# Patient Record
Sex: MALE | Race: WHITE | NOT HISPANIC OR LATINO | Employment: FULL TIME | URBAN - METROPOLITAN AREA
[De-identification: names, ages, dates, MRNs, and addresses within clinical notes are randomized per-mention and may not be internally consistent; named-entity substitution may affect disease eponyms.]

---

## 2022-04-20 ENCOUNTER — OFFICE VISIT (OUTPATIENT)
Dept: FAMILY MEDICINE | Facility: CLINIC | Age: 42
End: 2022-04-20
Payer: COMMERCIAL

## 2022-04-20 ENCOUNTER — ANCILLARY PROCEDURE (OUTPATIENT)
Dept: GENERAL RADIOLOGY | Facility: CLINIC | Age: 42
End: 2022-04-20
Attending: PHYSICIAN ASSISTANT
Payer: COMMERCIAL

## 2022-04-20 VITALS
TEMPERATURE: 98.1 F | BODY MASS INDEX: 29.99 KG/M2 | SYSTOLIC BLOOD PRESSURE: 134 MMHG | RESPIRATION RATE: 15 BRPM | DIASTOLIC BLOOD PRESSURE: 85 MMHG | WEIGHT: 215 LBS | HEART RATE: 79 BPM | OXYGEN SATURATION: 98 %

## 2022-04-20 DIAGNOSIS — S69.92XA INJURY OF LEFT THUMB, INITIAL ENCOUNTER: Primary | ICD-10-CM

## 2022-04-20 PROCEDURE — 73140 X-RAY EXAM OF FINGER(S): CPT | Mod: TC | Performed by: RADIOLOGY

## 2022-04-20 PROCEDURE — 99203 OFFICE O/P NEW LOW 30 MIN: CPT | Performed by: PHYSICIAN ASSISTANT

## 2022-04-20 RX ORDER — DIPHENHYDRAMINE HCL 25 MG
25 CAPSULE ORAL EVERY 6 HOURS PRN
COMMUNITY
End: 2022-11-02

## 2022-04-20 NOTE — PROGRESS NOTES
"  Assessment & Plan:      Problem List Items Addressed This Visit    None     Visit Diagnoses     Injury of left thumb, initial encounter    -  Primary    Relevant Orders    XR Finger Left G/E 2 Views (Completed)        Medical Decision Making  Patient presents with ongoing left thumb pain for 6 weeks.  X-rays negative for acute fracture and patient maintains full range of motion of all finger joints with pain during flexion of the IP joint.  Patient most likely sustained a sprain versus mild tendinitis.  Recommend cold compresses, ibuprofen, and avoidance of aggravating activities.  Discussed expected course of healing.  Discussed signs of worsening symptoms and when to follow-up with orthopedics if no symptom improvement.     Subjective:      Matteo Arellano is a 42 year old male here for evaluation of left thumb injuries.  Initial injury occurred 6 weeks ago.  Patient was moving heavy metal equipment out from the back of his truck.  One of the legs from a support beam fell down and landed on the patient's extended thumb.  Patient noted significant pain at that time.  Pain then gradually improved over the course of a week or 2.  He then was lifting something heavy and felt a \"pop\" in the left thumb.  Patient now notes difficulties flexing the thumb at the IP joint and a small rubbery mass at the palmar surface of the first MCP joint.     The following portions of the patient's history were reviewed and updated as appropriate: allergies, current medications, and problem list.     Review of Systems  Pertinent items are noted in HPI.    Allergies  No Known Allergies    No family history on file.    Social History     Tobacco Use     Smoking status: Never Smoker     Smokeless tobacco: Never Used   Substance Use Topics     Alcohol use: Yes     Comment: Alcoholic Drinks/day: occassional        Objective:      /85   Pulse 79   Temp 98.1  F (36.7  C)   Resp 15   Wt 97.5 kg (215 lb)   SpO2 98%   BMI 29.99 " "kg/m    General appearance - alert, well appearing, and in no distress and non-toxic  Extremities - Left hand: Tenderness to palpation of the first proximal phalanx and the first MCP joint, reduced flexion at the IP joint, but otherwise still is able to flex somewhat, extension is normal through the IP joint  Skin - Left thumb: No swelling, ecchymosis, or erythema, skin is normal to touch; skin intact     Lab & Imaging Results    Results for orders placed or performed in visit on 04/20/22 (from the past 24 hour(s))   XR Finger Left G/E 2 Views    Narrative    EXAM: XR FINGER LEFT G/E 2 VIEWS  LOCATION: [facility]  DATE/TIME: 4/20/2022 10:53 AM    INDICATION: crushing injury 6 weeks ago, and a \"snap\" sensation 2 weeks ago near the 1st MCP joint; rule out acute fracture versus avulsion fracture  COMPARISON: None.      Impression    IMPRESSION: Normal joint spaces and alignment. No fracture.         I personally reviewed these results and discussed findings with the patient.    The use of Dragon/Sporterpilot dictation services was used to construct the content of this note; any grammatical errors are non-intentional. Please contact the author directly if you are in need of any clarification.     "

## 2022-04-20 NOTE — PATIENT INSTRUCTIONS
You were seen today for a(n) thumb injury. The x-ray showed no signs of a bone fracture.    Symptom management:  - Rest the injured site  - Ice pads applied 10-15 minutes up to every hour as needed  - Compression with light bandages or brace  - Elevation of the affected area above the chest  - May use ibuprofen to help with swelling and discomfort    If no improvement in symptoms in 1 week, recommend follow-up with your primary care provider for further evaluation.    Reasons to return sooner for re-evaluation:  - Numbness or tingling develops around the site of injury  - Develop severe or worsening pain  - Area becomes blue or cold to the touch

## 2022-04-21 ENCOUNTER — TRANSFERRED RECORDS (OUTPATIENT)
Dept: HEALTH INFORMATION MANAGEMENT | Facility: CLINIC | Age: 42
End: 2022-04-21
Payer: COMMERCIAL

## 2022-05-22 ENCOUNTER — HEALTH MAINTENANCE LETTER (OUTPATIENT)
Age: 42
End: 2022-05-22

## 2022-11-02 ENCOUNTER — APPOINTMENT (OUTPATIENT)
Dept: CT IMAGING | Facility: CLINIC | Age: 42
DRG: 373 | End: 2022-11-02
Attending: EMERGENCY MEDICINE
Payer: COMMERCIAL

## 2022-11-02 ENCOUNTER — HOSPITAL ENCOUNTER (INPATIENT)
Facility: CLINIC | Age: 42
LOS: 2 days | Discharge: HOME | DRG: 373 | End: 2022-11-06
Attending: EMERGENCY MEDICINE | Admitting: INTERNAL MEDICINE
Payer: COMMERCIAL

## 2022-11-02 DIAGNOSIS — K52.9 COLITIS: ICD-10-CM

## 2022-11-02 LAB
ALBUMIN SERPL-MCNC: 4.1 G/DL (ref 3.5–5)
ALP SERPL-CCNC: 87 U/L (ref 45–120)
ALT SERPL W P-5'-P-CCNC: 72 U/L (ref 0–45)
ANION GAP SERPL CALCULATED.3IONS-SCNC: 12 MMOL/L (ref 5–18)
AST SERPL W P-5'-P-CCNC: 39 U/L (ref 0–40)
BILIRUB SERPL-MCNC: 0.6 MG/DL (ref 0–1)
BUN SERPL-MCNC: 8 MG/DL (ref 8–22)
C REACTIVE PROTEIN LHE: 3.3 MG/DL (ref 0–?)
CALCIUM SERPL-MCNC: 9.6 MG/DL (ref 8.5–10.5)
CHLORIDE BLD-SCNC: 101 MMOL/L (ref 98–107)
CO2 SERPL-SCNC: 25 MMOL/L (ref 22–31)
CREAT SERPL-MCNC: 0.85 MG/DL (ref 0.7–1.3)
ERYTHROCYTE [DISTWIDTH] IN BLOOD BY AUTOMATED COUNT: 13.9 % (ref 10–15)
GFR SERPL CREATININE-BSD FRML MDRD: >90 ML/MIN/1.73M2
GLUCOSE BLD-MCNC: 101 MG/DL (ref 70–125)
HCT VFR BLD AUTO: 50.7 % (ref 40–53)
HGB BLD-MCNC: 16.3 G/DL (ref 13.3–17.7)
LACTATE SERPL-SCNC: 1.6 MMOL/L (ref 0.7–2)
LIPASE SERPL-CCNC: 61 U/L (ref 0–52)
MCH RBC QN AUTO: 28.2 PG (ref 26.5–33)
MCHC RBC AUTO-ENTMCNC: 32.1 G/DL (ref 31.5–36.5)
MCV RBC AUTO: 88 FL (ref 78–100)
PLATELET # BLD AUTO: 250 10E3/UL (ref 150–450)
POTASSIUM BLD-SCNC: 3.9 MMOL/L (ref 3.5–5)
PROT SERPL-MCNC: 8.2 G/DL (ref 6–8)
RBC # BLD AUTO: 5.78 10E6/UL (ref 4.4–5.9)
SODIUM SERPL-SCNC: 138 MMOL/L (ref 136–145)
WBC # BLD AUTO: 15.9 10E3/UL (ref 4–11)

## 2022-11-02 PROCEDURE — 258N000003 HC RX IP 258 OP 636: Performed by: EMERGENCY MEDICINE

## 2022-11-02 PROCEDURE — 96375 TX/PRO/DX INJ NEW DRUG ADDON: CPT

## 2022-11-02 PROCEDURE — 250N000013 HC RX MED GY IP 250 OP 250 PS 637: Performed by: INTERNAL MEDICINE

## 2022-11-02 PROCEDURE — C9803 HOPD COVID-19 SPEC COLLECT: HCPCS

## 2022-11-02 PROCEDURE — G0378 HOSPITAL OBSERVATION PER HR: HCPCS

## 2022-11-02 PROCEDURE — 99285 EMERGENCY DEPT VISIT HI MDM: CPT | Mod: 25

## 2022-11-02 PROCEDURE — 250N000011 HC RX IP 250 OP 636: Performed by: EMERGENCY MEDICINE

## 2022-11-02 PROCEDURE — 99220 PR INITIAL OBSERVATION CARE,LEVEL III: CPT | Performed by: INTERNAL MEDICINE

## 2022-11-02 PROCEDURE — 96374 THER/PROPH/DIAG INJ IV PUSH: CPT | Mod: XU

## 2022-11-02 PROCEDURE — 74177 CT ABD & PELVIS W/CONTRAST: CPT

## 2022-11-02 PROCEDURE — 83605 ASSAY OF LACTIC ACID: CPT | Performed by: EMERGENCY MEDICINE

## 2022-11-02 PROCEDURE — 85027 COMPLETE CBC AUTOMATED: CPT | Performed by: EMERGENCY MEDICINE

## 2022-11-02 PROCEDURE — U0005 INFEC AGEN DETEC AMPLI PROBE: HCPCS | Performed by: EMERGENCY MEDICINE

## 2022-11-02 PROCEDURE — 83690 ASSAY OF LIPASE: CPT | Performed by: INTERNAL MEDICINE

## 2022-11-02 PROCEDURE — 36415 COLL VENOUS BLD VENIPUNCTURE: CPT | Performed by: EMERGENCY MEDICINE

## 2022-11-02 PROCEDURE — 86140 C-REACTIVE PROTEIN: CPT | Performed by: EMERGENCY MEDICINE

## 2022-11-02 PROCEDURE — 80053 COMPREHEN METABOLIC PANEL: CPT | Performed by: EMERGENCY MEDICINE

## 2022-11-02 RX ORDER — IOPAMIDOL 755 MG/ML
90 INJECTION, SOLUTION INTRAVASCULAR ONCE
Status: COMPLETED | OUTPATIENT
Start: 2022-11-02 | End: 2022-11-02

## 2022-11-02 RX ORDER — ONDANSETRON 4 MG/1
4 TABLET, ORALLY DISINTEGRATING ORAL EVERY 6 HOURS PRN
Status: DISCONTINUED | OUTPATIENT
Start: 2022-11-02 | End: 2022-11-02

## 2022-11-02 RX ORDER — OMEPRAZOLE 20 MG/1
20 TABLET, DELAYED RELEASE ORAL DAILY PRN
COMMUNITY
End: 2022-11-07

## 2022-11-02 RX ORDER — ONDANSETRON 2 MG/ML
4 INJECTION INTRAMUSCULAR; INTRAVENOUS ONCE
Status: COMPLETED | OUTPATIENT
Start: 2022-11-02 | End: 2022-11-02

## 2022-11-02 RX ORDER — ACETAMINOPHEN 325 MG/1
650 TABLET ORAL EVERY 6 HOURS PRN
Status: DISCONTINUED | OUTPATIENT
Start: 2022-11-02 | End: 2022-11-02

## 2022-11-02 RX ORDER — ACETAMINOPHEN 325 MG/1
650 TABLET ORAL EVERY 4 HOURS PRN
Status: DISCONTINUED | OUTPATIENT
Start: 2022-11-02 | End: 2022-11-06 | Stop reason: HOSPADM

## 2022-11-02 RX ORDER — CALCIUM CARBONATE 500 MG/1
1-2 TABLET, CHEWABLE ORAL 2 TIMES DAILY PRN
COMMUNITY

## 2022-11-02 RX ORDER — ACETAMINOPHEN 650 MG/1
650 SUPPOSITORY RECTAL EVERY 6 HOURS PRN
Status: DISCONTINUED | OUTPATIENT
Start: 2022-11-02 | End: 2022-11-02

## 2022-11-02 RX ORDER — MORPHINE SULFATE 4 MG/ML
4 INJECTION, SOLUTION INTRAMUSCULAR; INTRAVENOUS ONCE
Status: COMPLETED | OUTPATIENT
Start: 2022-11-02 | End: 2022-11-02

## 2022-11-02 RX ORDER — SODIUM CHLORIDE 9 MG/ML
INJECTION, SOLUTION INTRAVENOUS CONTINUOUS
Status: DISCONTINUED | OUTPATIENT
Start: 2022-11-02 | End: 2022-11-06 | Stop reason: HOSPADM

## 2022-11-02 RX ORDER — ONDANSETRON 2 MG/ML
4 INJECTION INTRAMUSCULAR; INTRAVENOUS EVERY 6 HOURS PRN
Status: DISCONTINUED | OUTPATIENT
Start: 2022-11-02 | End: 2022-11-02

## 2022-11-02 RX ORDER — HYDROCODONE BITARTRATE AND ACETAMINOPHEN 5; 325 MG/1; MG/1
1 TABLET ORAL EVERY 4 HOURS PRN
Status: DISCONTINUED | OUTPATIENT
Start: 2022-11-02 | End: 2022-11-04

## 2022-11-02 RX ORDER — ACETAMINOPHEN 650 MG/1
650 SUPPOSITORY RECTAL EVERY 6 HOURS PRN
Status: DISCONTINUED | OUTPATIENT
Start: 2022-11-02 | End: 2022-11-06 | Stop reason: HOSPADM

## 2022-11-02 RX ORDER — HYDROMORPHONE HYDROCHLORIDE 1 MG/ML
0.3 INJECTION, SOLUTION INTRAMUSCULAR; INTRAVENOUS; SUBCUTANEOUS EVERY 4 HOURS PRN
Status: DISCONTINUED | OUTPATIENT
Start: 2022-11-02 | End: 2022-11-05

## 2022-11-02 RX ADMIN — SODIUM CHLORIDE 1000 ML: 9 INJECTION, SOLUTION INTRAVENOUS at 21:11

## 2022-11-02 RX ADMIN — MORPHINE SULFATE 4 MG: 4 INJECTION, SOLUTION INTRAMUSCULAR; INTRAVENOUS at 21:12

## 2022-11-02 RX ADMIN — IOPAMIDOL 90 ML: 755 INJECTION, SOLUTION INTRAVENOUS at 20:32

## 2022-11-02 RX ADMIN — HYDROCODONE BITARTRATE AND ACETAMINOPHEN 1 TABLET: 5; 325 TABLET ORAL at 23:00

## 2022-11-02 RX ADMIN — ONDANSETRON 4 MG: 2 INJECTION INTRAMUSCULAR; INTRAVENOUS at 21:12

## 2022-11-02 ASSESSMENT — ACTIVITIES OF DAILY LIVING (ADL)
ADLS_ACUITY_SCORE: 35
ADLS_ACUITY_SCORE: 35

## 2022-11-03 LAB
ALBUMIN SERPL-MCNC: 3.4 G/DL (ref 3.5–5)
ALP SERPL-CCNC: 77 U/L (ref 45–120)
ALT SERPL W P-5'-P-CCNC: 52 U/L (ref 0–45)
ANION GAP SERPL CALCULATED.3IONS-SCNC: 10 MMOL/L (ref 5–18)
AST SERPL W P-5'-P-CCNC: 26 U/L (ref 0–40)
BASOPHILS # BLD AUTO: 0 10E3/UL (ref 0–0.2)
BASOPHILS NFR BLD AUTO: 0 %
BILIRUB SERPL-MCNC: 1 MG/DL (ref 0–1)
BUN SERPL-MCNC: 9 MG/DL (ref 8–22)
C DIFF TOX B STL QL: NEGATIVE
CALCIUM SERPL-MCNC: 8.6 MG/DL (ref 8.5–10.5)
CHLORIDE BLD-SCNC: 104 MMOL/L (ref 98–107)
CO2 SERPL-SCNC: 26 MMOL/L (ref 22–31)
CREAT SERPL-MCNC: 0.83 MG/DL (ref 0.7–1.3)
EOSINOPHIL # BLD AUTO: 0 10E3/UL (ref 0–0.7)
EOSINOPHIL # BLD AUTO: 0 10E3/UL (ref 0–0.7)
EOSINOPHIL # BLD AUTO: 0.2 10E3/UL (ref 0–0.7)
EOSINOPHIL NFR BLD AUTO: 0 %
EOSINOPHIL NFR BLD AUTO: 0 %
EOSINOPHIL NFR BLD AUTO: 1 %
ERYTHROCYTE [DISTWIDTH] IN BLOOD BY AUTOMATED COUNT: 13.8 % (ref 10–15)
ERYTHROCYTE [DISTWIDTH] IN BLOOD BY AUTOMATED COUNT: 14 % (ref 10–15)
GFR SERPL CREATININE-BSD FRML MDRD: >90 ML/MIN/1.73M2
GLUCOSE BLD-MCNC: 96 MG/DL (ref 70–125)
HCT VFR BLD AUTO: 46 % (ref 40–53)
HCT VFR BLD AUTO: 46 % (ref 40–53)
HCT VFR BLD AUTO: 46.2 % (ref 40–53)
HCT VFR BLD AUTO: 47.8 % (ref 40–53)
HEMOCCULT STL QL: POSITIVE
HGB BLD-MCNC: 14.8 G/DL (ref 13.3–17.7)
HGB BLD-MCNC: 14.9 G/DL (ref 13.3–17.7)
HGB BLD-MCNC: 15.2 G/DL (ref 13.3–17.7)
HGB BLD-MCNC: 15.2 G/DL (ref 13.3–17.7)
HOLD SPECIMEN: NORMAL
IMM GRANULOCYTES # BLD: 0 10E3/UL
IMM GRANULOCYTES # BLD: 0.1 10E3/UL
IMM GRANULOCYTES # BLD: 0.1 10E3/UL
IMM GRANULOCYTES NFR BLD: 0 %
LYMPHOCYTES # BLD AUTO: 2.4 10E3/UL (ref 0.8–5.3)
LYMPHOCYTES # BLD AUTO: 2.7 10E3/UL (ref 0.8–5.3)
LYMPHOCYTES # BLD AUTO: 3 10E3/UL (ref 0.8–5.3)
LYMPHOCYTES NFR BLD AUTO: 16 %
LYMPHOCYTES NFR BLD AUTO: 17 %
LYMPHOCYTES NFR BLD AUTO: 19 %
MCH RBC QN AUTO: 28.4 PG (ref 26.5–33)
MCH RBC QN AUTO: 28.4 PG (ref 26.5–33)
MCH RBC QN AUTO: 28.9 PG (ref 26.5–33)
MCH RBC QN AUTO: 29.1 PG (ref 26.5–33)
MCHC RBC AUTO-ENTMCNC: 31.8 G/DL (ref 31.5–36.5)
MCHC RBC AUTO-ENTMCNC: 32 G/DL (ref 31.5–36.5)
MCHC RBC AUTO-ENTMCNC: 32.4 G/DL (ref 31.5–36.5)
MCHC RBC AUTO-ENTMCNC: 33 G/DL (ref 31.5–36.5)
MCV RBC AUTO: 88 FL (ref 78–100)
MCV RBC AUTO: 88 FL (ref 78–100)
MCV RBC AUTO: 89 FL (ref 78–100)
MCV RBC AUTO: 91 FL (ref 78–100)
MONOCYTES # BLD AUTO: 1.3 10E3/UL (ref 0–1.3)
MONOCYTES # BLD AUTO: 1.3 10E3/UL (ref 0–1.3)
MONOCYTES # BLD AUTO: 1.4 10E3/UL (ref 0–1.3)
MONOCYTES NFR BLD AUTO: 8 %
MONOCYTES NFR BLD AUTO: 9 %
MONOCYTES NFR BLD AUTO: 9 %
NEUTROPHILS # BLD AUTO: 10.8 10E3/UL (ref 1.6–8.3)
NEUTROPHILS # BLD AUTO: 11.6 10E3/UL (ref 1.6–8.3)
NEUTROPHILS # BLD AUTO: 11.8 10E3/UL (ref 1.6–8.3)
NEUTROPHILS NFR BLD AUTO: 71 %
NEUTROPHILS NFR BLD AUTO: 74 %
NEUTROPHILS NFR BLD AUTO: 76 %
NRBC # BLD AUTO: 0 10E3/UL
NRBC BLD AUTO-RTO: 0 /100
PLATELET # BLD AUTO: 202 10E3/UL (ref 150–450)
PLATELET # BLD AUTO: 214 10E3/UL (ref 150–450)
PLATELET # BLD AUTO: 226 10E3/UL (ref 150–450)
PLATELET # BLD AUTO: 230 10E3/UL (ref 150–450)
POTASSIUM BLD-SCNC: 3.7 MMOL/L (ref 3.5–5)
PROT SERPL-MCNC: 6.9 G/DL (ref 6–8)
RBC # BLD AUTO: 5.22 10E6/UL (ref 4.4–5.9)
RBC # BLD AUTO: 5.22 10E6/UL (ref 4.4–5.9)
RBC # BLD AUTO: 5.24 10E6/UL (ref 4.4–5.9)
RBC # BLD AUTO: 5.26 10E6/UL (ref 4.4–5.9)
SARS-COV-2 RNA RESP QL NAA+PROBE: NEGATIVE
SODIUM SERPL-SCNC: 140 MMOL/L (ref 136–145)
WBC # BLD AUTO: 14.9 10E3/UL (ref 4–11)
WBC # BLD AUTO: 15.3 10E3/UL (ref 4–11)
WBC # BLD AUTO: 15.7 10E3/UL (ref 4–11)
WBC # BLD AUTO: 15.8 10E3/UL (ref 4–11)

## 2022-11-03 PROCEDURE — 87493 C DIFF AMPLIFIED PROBE: CPT | Performed by: INTERNAL MEDICINE

## 2022-11-03 PROCEDURE — 258N000003 HC RX IP 258 OP 636: Performed by: INTERNAL MEDICINE

## 2022-11-03 PROCEDURE — 99225 PR SUBSEQUENT OBSERVATION CARE,LEVEL II: CPT | Performed by: STUDENT IN AN ORGANIZED HEALTH CARE EDUCATION/TRAINING PROGRAM

## 2022-11-03 PROCEDURE — 85014 HEMATOCRIT: CPT | Performed by: STUDENT IN AN ORGANIZED HEALTH CARE EDUCATION/TRAINING PROGRAM

## 2022-11-03 PROCEDURE — 250N000013 HC RX MED GY IP 250 OP 250 PS 637: Performed by: INTERNAL MEDICINE

## 2022-11-03 PROCEDURE — 36415 COLL VENOUS BLD VENIPUNCTURE: CPT | Performed by: STUDENT IN AN ORGANIZED HEALTH CARE EDUCATION/TRAINING PROGRAM

## 2022-11-03 PROCEDURE — 86901 BLOOD TYPING SEROLOGIC RH(D): CPT | Performed by: STUDENT IN AN ORGANIZED HEALTH CARE EDUCATION/TRAINING PROGRAM

## 2022-11-03 PROCEDURE — 87506 IADNA-DNA/RNA PROBE TQ 6-11: CPT | Performed by: INTERNAL MEDICINE

## 2022-11-03 PROCEDURE — 80053 COMPREHEN METABOLIC PANEL: CPT | Performed by: INTERNAL MEDICINE

## 2022-11-03 PROCEDURE — 82272 OCCULT BLD FECES 1-3 TESTS: CPT | Performed by: INTERNAL MEDICINE

## 2022-11-03 PROCEDURE — 87209 SMEAR COMPLEX STAIN: CPT | Performed by: INTERNAL MEDICINE

## 2022-11-03 PROCEDURE — G0378 HOSPITAL OBSERVATION PER HR: HCPCS

## 2022-11-03 PROCEDURE — 85025 COMPLETE CBC W/AUTO DIFF WBC: CPT | Performed by: INTERNAL MEDICINE

## 2022-11-03 PROCEDURE — 250N000011 HC RX IP 250 OP 636: Performed by: INTERNAL MEDICINE

## 2022-11-03 PROCEDURE — 86850 RBC ANTIBODY SCREEN: CPT | Performed by: STUDENT IN AN ORGANIZED HEALTH CARE EDUCATION/TRAINING PROGRAM

## 2022-11-03 PROCEDURE — 85027 COMPLETE CBC AUTOMATED: CPT | Performed by: STUDENT IN AN ORGANIZED HEALTH CARE EDUCATION/TRAINING PROGRAM

## 2022-11-03 PROCEDURE — 96375 TX/PRO/DX INJ NEW DRUG ADDON: CPT

## 2022-11-03 PROCEDURE — 96376 TX/PRO/DX INJ SAME DRUG ADON: CPT

## 2022-11-03 PROCEDURE — 36415 COLL VENOUS BLD VENIPUNCTURE: CPT | Performed by: INTERNAL MEDICINE

## 2022-11-03 RX ADMIN — HYDROMORPHONE HYDROCHLORIDE 0.3 MG: 1 INJECTION, SOLUTION INTRAMUSCULAR; INTRAVENOUS; SUBCUTANEOUS at 03:25

## 2022-11-03 RX ADMIN — SODIUM CHLORIDE: 9 INJECTION, SOLUTION INTRAVENOUS at 00:51

## 2022-11-03 RX ADMIN — PROCHLORPERAZINE EDISYLATE 5 MG: 5 INJECTION INTRAMUSCULAR; INTRAVENOUS at 18:04

## 2022-11-03 RX ADMIN — ACETAMINOPHEN 650 MG: 325 TABLET, FILM COATED ORAL at 21:38

## 2022-11-03 RX ADMIN — HYDROMORPHONE HYDROCHLORIDE 0.3 MG: 1 INJECTION, SOLUTION INTRAMUSCULAR; INTRAVENOUS; SUBCUTANEOUS at 07:37

## 2022-11-03 RX ADMIN — HYDROCODONE BITARTRATE AND ACETAMINOPHEN 1 TABLET: 5; 325 TABLET ORAL at 07:36

## 2022-11-03 RX ADMIN — HYDROCODONE BITARTRATE AND ACETAMINOPHEN 1 TABLET: 5; 325 TABLET ORAL at 12:18

## 2022-11-03 RX ADMIN — HYDROCODONE BITARTRATE AND ACETAMINOPHEN 1 TABLET: 5; 325 TABLET ORAL at 03:21

## 2022-11-03 ASSESSMENT — ACTIVITIES OF DAILY LIVING (ADL)
ADLS_ACUITY_SCORE: 35

## 2022-11-03 NOTE — ED PROVIDER NOTES
EMERGENCY DEPARTMENT ENCOUNTER      NAME: Brennan Arellano  AGE: 42 year old male  YOB: 1980  MRN: [id number]  EVALUATION DATE & TIME: 11/2/2022  7:18 PM    PCP: System, Provider Not In    ED PROVIDER: Tushar Olmedo M.D.      Chief Complaint   Patient presents with     Abdominal Pain     Bloody diarrhea         FINAL IMPRESSION:  Acute colitis      ED COURSE & MEDICAL DECISION MAKING:    Pertinent Labs & Imaging studies reviewed. (See chart for details)  42 year old male presents to the Emergency Department for evaluation of abdominal pain and bloody diarrhea.  Patient started having diarrhea few days ago.  Now kind of pink-tinged.  With that she developed severe achiness.  Localizes it to right lower quadrant.  Worsens with movement.  No prior episodes.  No unusual exposures or ingestions.  No recent antibiotics.  On exam he is a moderately obese male in mild distress.  Heart and lungs unremarkable.  Patient with marked right lower quadrant tenderness and guarding.  Primary concern is appendicitis.  Possibility of diverticulitis.  Laboratory evaluation from triage reveals a mild elevation of C-reactive protein and moderate leukocytosis of 15.9.  CT imaging ordered.  Patient last significant ingestion around 11 AM this morning.  Patient made NPO.  Intravenous fluids initiated.  Treated symptomatically with intravenous Zofran and morphine. Patient appears non toxic with stable vitals signs. O    7:13 PM  I met with the patient for the initial interview and physical examination. Discussed plan for treatment and workup in the ED.    9:08 PM.  CT imaging with evidence of colitis within the ascending colon consistent with his area of discomfort.  Given his bloody stools and first episode of colitis plan will be for hospitalization.  9:30 PM.  Patient discussed with  was agreeable to plans for observation status.  At the conclusion of the encounter I discussed the results of all of the tests and the  disposition. The questions were answered and return precautions provided. The patient or family acknowledged understanding and was agreeable with the care plan.       PPE: Provider wore gloves, N95 mask, eye protection.     MEDICATIONS GIVEN IN THE EMERGENCY:  Medications   morphine (PF) injection 4 mg (has no administration in time range)   ondansetron (ZOFRAN) injection 4 mg (has no administration in time range)   0.9% sodium chloride BOLUS (has no administration in time range)   iopamidol (ISOVUE-370) solution 90 mL (90 mLs Intravenous Given 11/2/22 2032)       NEW PRESCRIPTIONS STARTED AT TODAY'S ER VISIT  New Prescriptions    No medications on file          =================================================================    HPI    Patient information was obtained from: Patient    Use of Intrepreter: N/A         Brennan Arellano is a 42 year old male with no pertient medical history on file who presents to the ED for evaluation of abdominal pain and blood diarrhea.  Patient reports a severe achiness in the right lower quadrant.  Worsens with movement.  No prior episodes    Patient has RLQ abdominal pain and watery red stools that began yesterday morning. Patients last meal was at 11:00 am which was rice. Patient denies any unusual exposures, ingestions, new antibiotics, and dysuria.       REVIEW OF SYSTEMS   Constitutional:  Denies fever, chills  Respiratory:  Denies productive cough or increased work of breathing  Cardiovascular:  Denies chest pain, palpitations  GI:  Denies nausea, vomiting, dysuria. Positive for RLQ abdominal pain, diarrhea, and blood in stools.   Musculoskeletal:  Denies any new muscle/joint swelling  Skin:  Denies rash   Neurologic:  Denies focal weakness  All systems negative except as marked.     PAST MEDICAL HISTORY:  History reviewed. No pertinent past medical history.    PAST SURGICAL HISTORY:  History reviewed. No pertinent surgical history.      CURRENT MEDICATIONS:      Current  "Facility-Administered Medications:      0.9% sodium chloride BOLUS, 1,000 mL, Intravenous, Once, Tushar Olmedo MD     morphine (PF) injection 4 mg, 4 mg, Intravenous, Once, Tushar Olmedo MD     ondansetron (ZOFRAN) injection 4 mg, 4 mg, Intravenous, Once, Tushar Olmedo MD    Current Outpatient Medications:      diphenhydrAMINE (BENADRYL) 25 MG capsule, Take 25 mg by mouth every 6 hours as needed for itching or allergies, Disp: , Rfl:      lidocaine (LIDODERM) 5 %, [LIDOCAINE (LIDODERM) 5 %] Remove & Discard patch within 12 hours or as directed by MD (Patient not taking: Reported on 4/20/2022), Disp: 6 patch, Rfl: 0    ALLERGIES:  No Known Allergies    FAMILY HISTORY:  History reviewed. No pertinent family history.    SOCIAL HISTORY:   Social History     Socioeconomic History     Marital status:      Spouse name: None     Number of children: None     Years of education: None     Highest education level: None   Tobacco Use     Smoking status: Never     Smokeless tobacco: Never   Substance and Sexual Activity     Alcohol use: Yes     Comment: Alcoholic Drinks/day: occassional     Drug use: No       VITALS:  Patient Vitals for the past 24 hrs:   BP Temp Temp src Pulse Resp SpO2 Height Weight   11/02/22 1627 (!) 156/112 98  F (36.7  C) Temporal 97 16 98 % 1.803 m (5' 11\") 97.5 kg (215 lb)        PHYSICAL EXAM    Constitutional:  Awake, alert, moderate apparent distress, moderately obese.   HENT:  Normocephalic, Atraumatic. Bilateral external ears normal. Oropharynx moist. Nose normal. Neck- Normal range of motio  Eyes:  PERRL, EOMI with no signs of entrapment, Conjunctiva normal, No discharge.   Respiratory:  Normal breath sounds, No respiratory distress, No wheezing.    Cardiovascular:  Normal heart rate, Normal rhythm, No appreciable rubs or gallops.   GI: RLQ abdominal tenderness to palpation with guarding.  Musculoskeletal:  Intact distal pulses, No edema. Good range of motion in all major joints. "   Integument:  Warm, Dry, No erythema, No rash.   Neurologic:  Alert & oriented, Normal motor function, Normal sensory function, No focal deficits noted.   Psychiatric:  Affect normal, Judgment normal, Mood normal.     LAB:  All pertinent labs reviewed and interpreted.  Results for orders placed or performed during the hospital encounter of 11/02/22   Comprehensive metabolic panel   Result Value Ref Range    Sodium 138 136 - 145 mmol/L    Potassium 3.9 3.5 - 5.0 mmol/L    Chloride 101 98 - 107 mmol/L    Carbon Dioxide (CO2) 25 22 - 31 mmol/L    Anion Gap 12 5 - 18 mmol/L    Urea Nitrogen 8 8 - 22 mg/dL    Creatinine 0.85 0.70 - 1.30 mg/dL    Calcium 9.6 8.5 - 10.5 mg/dL    Glucose 101 70 - 125 mg/dL    Alkaline Phosphatase 87 45 - 120 U/L    AST 39 0 - 40 U/L    ALT 72 (H) 0 - 45 U/L    Protein Total 8.2 (H) 6.0 - 8.0 g/dL    Albumin 4.1 3.5 - 5.0 g/dL    Bilirubin Total 0.6 0.0 - 1.0 mg/dL    GFR Estimate >90 >60 mL/min/1.73m2   Lactic acid whole blood   Result Value Ref Range    Lactic Acid 1.6 0.7 - 2.0 mmol/L   CRP inflammation   Result Value Ref Range    CRP 3.3 (H) 0.0 - <0.8 mg/dL   CBC (+ platelets, no diff)   Result Value Ref Range    WBC Count 15.9 (H) 4.0 - 11.0 10e3/uL    RBC Count 5.78 4.40 - 5.90 10e6/uL    Hemoglobin 16.3 13.3 - 17.7 g/dL    Hematocrit 50.7 40.0 - 53.0 %    MCV 88 78 - 100 fL    MCH 28.2 26.5 - 33.0 pg    MCHC 32.1 31.5 - 36.5 g/dL    RDW 13.9 10.0 - 15.0 %    Platelet Count 250 150 - 450 10e3/uL       RADIOLOGY:  Reviewed all pertinent imaging. Please see official radiology report.  CT Abdomen Pelvis w Contrast    Result Date: 11/2/2022  EXAM: CT ABDOMEN PELVIS W CONTRAST LOCATION: [facility] DATE/TIME: 11/2/2022 8:31 PM INDICATION: Bloody stools, right lower quadrant tenderness with COMPARISON: None. TECHNIQUE: CT scan of the abdomen and pelvis was performed following injection of IV contrast. Multiplanar reformats were obtained. Dose reduction techniques  were used. CONTRAST: [id number] 90ml FINDINGS: LOWER CHEST: Normal. HEPATOBILIARY: Normal. PANCREAS: Normal. SPLEEN: Normal. ADRENAL GLANDS: Normal. KIDNEYS/BLADDER: Normal. BOWEL: Significant low-attenuation wall thickening involving the cecum and ascending colon with less advanced changes of colitis in the remainder of the colon. There is surrounding inflammatory change seen in the right hemicolon. LYMPH NODES: Normal. VASCULATURE: Unremarkable. PELVIC ORGANS: Normal. MUSCULOSKELETAL: Normal.     IMPRESSION: 1.  Significant low-attenuation wall thickening in the right hemicolon with less pronounced changes in the remainder of the colon compatible with colitis. 2.  Fatty liver.        I, Jarek Garcia, am serving as a scribe to document services personally performed by Tushar Olmedo MD, based on my observation and the provider's statements to me. I, Tushar Olmedo MD attest that Jarek Garcia is acting in a scribe capacity, has observed my performance of the services and has documented them in accordance with my direction.    Tushar Olmedo M.D.  Emergency Medicine  [facility] EMERGENCY ROOM       Tushar Olmedo MD  11/02/22 6819

## 2022-11-03 NOTE — PLAN OF CARE
Problem: Plan of Care - These are the overarching goals to be used throughout the patient stay.    Goal: Optimal Comfort and Wellbeing  Intervention: Monitor Pain and Promote Comfort  Recent Flowsheet Documentation  Taken 11/3/2022 0400 by Vicki Perez RN  Pain Management Interventions:   care clustered   emotional support   pain management plan reviewed with patient/caregiver   quiet environment facilitated   repositioned   rest   therapeutic presence  Taken 11/2/2022 2300 by Vicki Perez RN  Pain Management Interventions:   care clustered   emotional support   pain management plan reviewed with patient/caregiver   quiet environment facilitated   repositioned   rest   therapeutic presence     Patient's vitals are stable, afebrile. Dilaudid and Norco administered for abdominal pain. Loose, bloody stools. Normal Saline at 125 ml/hr. Stool tests sent to lab. Will continue to monitor.     TOSHA Perez RN

## 2022-11-03 NOTE — PHARMACY-ADMISSION MEDICATION HISTORY
Pharmacy Note - Admission Medication History    Pertinent Provider Information: None. ______________________________________________________________________    Prior To Admission (PTA) med list completed and updated in EMR.       PTA Med List   Medication Sig Last Dose     calcium carbonate (TUMS) 500 MG chewable tablet Take 1-2 chew tab by mouth 2 times daily as needed for heartburn 11/2/2022     omeprazole (PRILOSEC OTC) 20 MG EC tablet Take 20 mg by mouth daily as needed Past Month     Information source(s): Patient, Clinic records and [facility]  Method of interview communication: in-person    Summary of Changes to PTA Med List  New: Tums, prilosec  Discontinued: lidocaine patch, diphenhydramine   Changed: None    Patient was asked about OTC/herbal products specifically.  PTA med list reflects this.    In the past week, patient estimated taking medication this percent of the time:  greater than 90%.    Allergies were reviewed, assessed, and updated with the patient.      Patient does not use any multi-dose medications prior to admission.    The information provided in this note is only as accurate as the sources available at the time of the update(s).    Thank you for the opportunity to participate in the care of this patient.    Concetta Garcia, PharmD, [facility]  11/2/2022 9:21 PM

## 2022-11-03 NOTE — PROGRESS NOTES
Care Management Initial Consult    General Information  Assessment completed with: VM-chart review,    Type of CM/SW Visit: Chart Assessment             Additional Information:  SW reviewed chart.  Pt from home with spouse, independent at baseline. CM following care progression to assist as needed.     ALEX Mathew

## 2022-11-03 NOTE — CONSULTS
"GI CONSULT NOTE      Name: Brennan Arellano  Medical Record #: [id number]  YOB: 1980  Date of Admission: 11/2/2022  Date/Time: 11/3/2022/7:53 AM     CHIEF COMPLAINT: Abdominal pain, diarrhea    HISTORY OF PRESENT ILLNESS: We were asked to see Brennan Arellano by Dr. Franco for \"colitis.\"     Brennan Arellano is a 42 year old year old male without significant past medical history who presented to the ED yesterday with complains of 1 day of abdominal pain. Pain was worse on the right side and began the morning of 11/1/22. He then developed watery stools, and reports red blood mixed in with water stool yeserday. Pain increased progressively. ED work-up showed WBC 15.9. CT scan showed findings of diffuse colitis, worse in the right hemicolon. He is admitted with IV fluids. Stool studies are pending. He reports 5/10 right sided abdominal pain at rest, worse with movement.     In January, he contracted COVID 19, and he reports intermittent loose stools since. He thought this may be related to diet or alcohol intake and he cut back on drinking alcohol in June. However, he was not have any abdominal pain or bleeding until this episode. He has had joint aches and pains since his COVID diagnosis as well, has had cortisone injections in his thumb, but no concrete diagnosis per pt report.     He has never had a colonoscopy.     REVIEW OF SYSTEMS (ROS): Complete review of systems negative other than listed in HPI.    PAST MEDICAL HISTORY:  History reviewed. No pertinent past medical history.     FAMILY HISTORY:  History reviewed. No pertinent family history.    SOCIAL HISTORY:  Social History     Socioeconomic History     Marital status:      Spouse name: Not on file     Number of children: Not on file     Years of education: Not on file     Highest education level: Not on file   Occupational History     Not on file   Tobacco Use     Smoking status: Never     Smokeless tobacco: Never   Substance and Sexual Activity " "    Alcohol use: Yes     Comment: Alcoholic Drinks/day: occassional     Drug use: No     Sexual activity: Not on file   Other Topics Concern     Not on file   Social History Narrative     Not on file     Social Determinants of Health     Financial Resource Strain: Not on file   Food Insecurity: Not on file   Transportation Needs: Not on file   Physical Activity: Not on file   Stress: Not on file   Social Connections: Not on file   Intimate Partner Violence: Not on file   Housing Stability: Not on file     MEDICATIONS PRIOR TO ADMISSION:   Medications Prior to Admission   Medication Sig Dispense Refill Last Dose     calcium carbonate (TUMS) 500 MG chewable tablet Take 1-2 chew tab by mouth 2 times daily as needed for heartburn   11/2/2022     omeprazole (PRILOSEC OTC) 20 MG EC tablet Take 20 mg by mouth daily as needed   Past Month        ALLERGIES: Ambien [zolpidem]    PHYSICAL EXAM:    /66 (BP Location: Right arm, Patient Position: Semi-Higginbotham's, Cuff Size: Adult Regular)   Pulse 86   Temp 98  F (36.7  C) (Oral)   Resp 20   Ht 1.803 m (5' 11\")   Wt 97.5 kg (215 lb)   SpO2 97%   BMI 29.99 kg/m      GENERAL: Pleasant, no obvious distress  NECK: Supple without adenopathy  EYES: No scleral icterus  LUNGS: Clear to auscultation bilaterally  HEART: Regular rate and rhythm, S1 and S2 present, no lower extremity edema  ABDOMEN: Distended. Positive bowel sounds. Soft, right sided tenderness on palpation with voluntary guarding, no rebound/mass, no obvious organomegaly  MUSKULOSKELETAL:  Warm and well perfused, moves all extremities well  SKIN: No jaundice  NEUROLOGIC: Alert and oriented  PSYCHIATRIC: Normal affect    LAB DATA:  CMP Results:   Recent Labs   Lab Test 11/02/22  1809      POTASSIUM 3.9   CHLORIDE 101   CO2 25   ANIONGAP 12      BUN 8   CR 0.85   BILITOTAL 0.6   ALKPHOS 87   ALT 72*   AST 39      CBC  Recent Labs   Lab 11/02/22  1809   WBC 15.9*   RBC 5.78   HGB 16.3   HCT 50.7   MCV 88 "   MCH 28.2   MCHC 32.1   RDW 13.9        INRNo lab results found in last 7 days.   Lipase   Date Value Ref Range Status   11/02/2022 61 (H) 0 - 52 U/L Final     IMAGING:  EXAM: CT ABDOMEN PELVIS W CONTRAST  LOCATION: [facility]  DATE/TIME: 11/2/2022 8:31 PM     INDICATION: Bloody stools, right lower quadrant tenderness with  COMPARISON: None.  TECHNIQUE: CT scan of the abdomen and pelvis was performed following injection of IV contrast. Multiplanar reformats were obtained. Dose reduction techniques were used.  CONTRAST: [id number] 90ml     FINDINGS:   LOWER CHEST: Normal.     HEPATOBILIARY: Normal.     PANCREAS: Normal.     SPLEEN: Normal.     ADRENAL GLANDS: Normal.     KIDNEYS/BLADDER: Normal.     BOWEL: Significant low-attenuation wall thickening involving the cecum and ascending colon with less advanced changes of colitis in the remainder of the colon. There is surrounding inflammatory change seen in the right hemicolon.     LYMPH NODES: Normal.     VASCULATURE: Unremarkable.     PELVIC ORGANS: Normal.     MUSCULOSKELETAL: Normal.                                                                      IMPRESSION:   1.  Significant low-attenuation wall thickening in the right hemicolon with less pronounced changes in the remainder of the colon compatible with colitis.     2.  Fatty liver.    ASSESSMENT:  1. Colitis  42 year old male with multiple month history of intermittent diarrhea and arthralgias now with 2 days of acute onset abdominal pain and bloody stools. CT scan shows right sided colitis. Infectious colitis, IBD at top of differential. Recommend stool studies. If negative, then he will need colonoscopy, inaptient vs outpatient pending clinical course.     PLAN:  1. Follow-up pending stool studies (enteric bacteria, C. Diff, O&P).   2. Clear liquid diet today  3. Supportive cares  4. If stool studies negative, recommend colonoscopy, timing pending clinical course.      Discussed with Dr. Valle who will also visit with the patient.     TIME SPENT: 40 min including chart review, patient interview and care coordination.                                                Cira Logan PA-C  Thank you for the opportunity to participate in the care of this patient.   Please feel free to call me with any questions or concerns.  Phone number [phone].            JAMAAL LEDESMA Note    Patient interviewed, examined, chart reviewed.  I have discussed the further management of this patient with Cira Logan PA-C, and I agree with her assessment and plan.  Please see her note for details.    Briefly, Brennan Arellano is a 42-year-old gentleman who is overall healthy, who has had issues with occasional, intermittent episodes of looser stools along with arthralgias, for the past couple of months.  He now presents with a 2-day history of acute in onset right lower quadrant abdominal pain, associated with bloody stools.    These acute symptoms started soon after having a meal at [employer], he was accompanied by his wife at the meal, who also had some issue with emesis post meal, but subsequently has recovered without any ongoing symptoms.    On arrival, he was noted to have a mild leukocytosis, his hemoglobin is normal.  He remains hemodynamically stable.  CT scan of the abdomen pelvis done on arrival, reveals the presence of some wall thickening in the right colon, suggestive of colitis.    Stool studies so far have been negative for C. difficile.  Other stool studies pending.    His presentation certainly may suggest an infectious colitis.  Other possibilities certainly include inflammatory bowel disease.  He does not have any family history of Crohn's or ulcerative colitis.    We will await stool studies, and continue with supportive cares for now.  If stool studies are unrevealing, he will certainly benefit from a colonoscopy during this hospitalization.  If on the other hand, an infectious  source is identified, he may still benefit from a colonoscopy due to his occasional diarrhea going back a few months, though this could then wait to be done as an outpatient.    Thank you for this consultation, will follow along with you.    Clive Valle MD on 11/3/2022 at 2:30 PM  [facility]

## 2022-11-03 NOTE — PLAN OF CARE
PRIMARY DIAGNOSIS: ACUTE PAIN  OUTPATIENT/OBSERVATION GOALS TO BE MET BEFORE DISCHARGE:  1. Pain Status: Improved but still requiring IV narcotics.    2. Return to near baseline physical activity: Yes    3. Cleared for discharge by consultants (if involved): No    Discharge Planner Nurse   Safe discharge environment identified: Yes  Barriers to discharge: Yes       Entered by: Agnes Whaley RN 11/03/2022 1:12 PM     Patient complaining of pain 6-8/10 to R. Abdomen. Stool is dark red/maroon blood, says he has been having bloody stool since yesterday afternoon. Waiting on stool studies, on enteric precautions pending cdiff.  VSS.

## 2022-11-03 NOTE — PROGRESS NOTES
[facility] MEDICINE PROGRESS NOTE      Identification/Summary: Brennan Arellano is a 42 year old male with no significant past medical history who presented to the hospital with 2 days of right abdominal pain along with bloody bowel movements.  He was found to have right hemicolon colitis.      #Abdominal pain  #Acute colitis.  #GI bleed.  -Per GI documentation the patient has had intermittent diarrhea and arthralgia for the last few months.  -He presented with abdominal pain started around 2 days ago, subsequently multiple episodes of bloody and dark stools.  -CT abdomen showed right hemicolon colitis.  -Given his history and the location of his colitis, IBD particularly Crohn disease is on the differential.  Patient denies any family history of IBD.  -Other possible etiologies include infectious colitis.    Plan:  [] Follow-up with GI recommendations (clear liquid diet for today, if stool studies negative we will most likely proceed with colonoscopy, timing pending clinical course)  [] Follow-up with C. difficile studies.  [] Follow-up with the stool studies.  [] If the patient has recurrences of bloody bowel movement, will check CBC later on today.  [] Monitor vitals every 2 hours for now.  [] Continue normal saline at around 125 cc/h.  [] We will hold off antibiotic until further data from stool studies.           Diet: Clear Liquid Diet  DVT Prophylaxis:  Low Risk/Ambulatory with no VTE prophylaxis indicated  Code Status: Full Code    Anticipated possible discharge in 2 days to 4 once GI evaluation completed, pain is better controlled.  Patient is tolerating his diet.        Disposition Plan Home     Expected Discharge Date: 11/04/2022                The patient's care was discussed with the Bedside Nurse and Patient.    BRYANNA CHU MD  [facility]  [facility]  Phone: #[phone]    Interval History/Subjective:  Patient reported  RN returned call regarding message below to convey results.  Unable to reach pt on attempts.      intermittent fevers and chills.  He continues to have significant right sided abdominal pain.  He denies any vomiting.  He denies any chest pain or shortness of breath.    Physical Exam/Objective:  Temp:  [98  F (36.7  C)-98.2  F (36.8  C)] 98.2  F (36.8  C)  Pulse:  [80-97] 86  Resp:  [16-20] 20  BP: (122-156)/() 130/60  SpO2:  [94 %-98 %] 94 %  Body mass index is 29.99 kg/m .     Physical Exam  Constitutional:       General: He is not in acute distress.     Appearance: He is ill-appearing.   Cardiovascular:      Rate and Rhythm: Normal rate and regular rhythm.      Pulses: Normal pulses.      Heart sounds: Normal heart sounds. No murmur heard.  Pulmonary:      Effort: Pulmonary effort is normal. No respiratory distress.      Breath sounds: Normal breath sounds. No wheezing.   Abdominal:      General: Abdomen is flat. There is distension.      Palpations: Abdomen is soft.      Tenderness: There is abdominal tenderness.      Comments: Right upper and lower quadrants tenderness on light and deep palpation.   Musculoskeletal:         General: No swelling.   Skin:     General: Skin is warm and dry.   Neurological:      General: No focal deficit present.      Mental Status: He is alert. Mental status is at baseline.   Psychiatric:         Mood and Affect: Mood normal.         Behavior: Behavior normal.         Data reviewed today: I personally reviewed all new medications, labs, imaging/diagnostics reports over the past 24 hours. Pertinent findings include:    Imaging:   Recent Results (from the past 24 hour(s))   CT Abdomen Pelvis w Contrast    Narrative    EXAM: CT ABDOMEN PELVIS W CONTRAST  LOCATION: [facility]  DATE/TIME: 11/2/2022 8:31 PM    INDICATION: Bloody stools, right lower quadrant tenderness with  COMPARISON: None.  TECHNIQUE: CT scan of the abdomen and pelvis was performed following injection of IV contrast. Multiplanar reformats were obtained. Dose reduction techniques were  used.  CONTRAST: [id number] 90ml    FINDINGS:   LOWER CHEST: Normal.    HEPATOBILIARY: Normal.    PANCREAS: Normal.    SPLEEN: Normal.    ADRENAL GLANDS: Normal.    KIDNEYS/BLADDER: Normal.    BOWEL: Significant low-attenuation wall thickening involving the cecum and ascending colon with less advanced changes of colitis in the remainder of the colon. There is surrounding inflammatory change seen in the right hemicolon.    LYMPH NODES: Normal.    VASCULATURE: Unremarkable.    PELVIC ORGANS: Normal.    MUSCULOSKELETAL: Normal.      Impression    IMPRESSION:   1.  Significant low-attenuation wall thickening in the right hemicolon with less pronounced changes in the remainder of the colon compatible with colitis.    2.  Fatty liver.       Labs:  Most Recent 3 CBC's:Recent Labs   Lab Test 11/03/22  0706 11/02/22  1809   WBC 15.3* 15.9*   HGB 15.2 16.3   MCV 91 88    250     Most Recent 3 BMP's:Recent Labs   Lab Test 11/03/22  0708 11/02/22  1809    138   POTASSIUM 3.7 3.9   CHLORIDE 104 101   CO2 26 25   BUN 9 8   CR 0.83 0.85   ANIONGAP 10 12   MIC 8.6 9.6   GLC 96 101     Most Recent 2 LFT's:Recent Labs   Lab Test 11/03/22  0708 11/02/22  1809   AST 26 39   ALT 52* 72*   ALKPHOS 77 87   BILITOTAL 1.0 0.6       Medications:   Personally Reviewed.  Medications     sodium chloride 125 mL/hr at 11/03/22 0051

## 2022-11-03 NOTE — H&P
[facility] MEDICINE ADMISSION HISTORY AND PHYSICAL       Assessment & Plan      1. Right sided Abdominal pain, 2 days of diarrhea and followed by some blood mixed to his stool (Hgb normal). CT showing ---- Significant low-attenuation wall thickening in the right hemicolon with less pronounced changes in the remainder of the colon compatible with colitis.    R/O C diff, IBD or microscopic colitis  R/O infectious diarrhea   Low suspicion for ischemic colitis     - IVF, NPO, anti emetics, pain meds  - Consider GI   - AM labs  - C diff, Stool culture, and parasite test       2. WBC of 15.9 - no source  - repeat CBC in AM  - check UA       VTE prophylaxis: SCDs,   Diet:  NPO for now,  Code Status: Full,   COVID test result:  Pending    COVID vaccination: completed   Barriers to discharge: admitting clinical condition  Discharge Disposition and goals:  Unable to determine at this point, pending clinical progress and response to treatment. Patient may need transfer to SNF or ACR if unsafe to go home and needed treatment inappropriate at home setting OR may need home health care evaluation if care can be delivered at home settings. Consider referral to care manager/    PPE - I was wearing PPE when I met the patient - N95 mask, Surgical mask, Isolation gown, Gloves, Safety glasses.      Care plan was created based on available information provided, including patient's condition at the time of encounter.   This plan was discussed with patient and/or family members using layman's terms and have agreed to proceed.   At the end of night shift (9PM - 730A), this case will be presented to the AM Hospitalist.    It is recommended to revise care plan and review history if there is change in condition and/or new clinical information is not available during my encounter.     All or some of home medication/s were not resumed on admission due to safety reasons or contraindications. Dosing and  frequency may also have been modified. Please resume/review them during your visit.     70 minutes of total visit duration and greater than 50% was spent in direct evaluation of patient and coordination of care including discussion of diagnostic test results and recommended treatment. .      De Franco MD, MPH, FACP, [facility]  Internal Medicine - Hospitalist        Chief Complaint Abdominal pain      HISTORY     - Met him in ED-13 along with his wife. He came in with abdominal pain right sided and diarrhea.    - Its been 2 days now that he is having right sided abdominal pain and at least non bloody, no mucous diarrhea.   - No recent travels. No antibiotic exposure. Wife is not GI sick  - He feels nauseaous, feverish and dehydrated  - Today, he continue to have diarrhea and this afternoon had blood with his diarrhea. No black stools.  - No urinary symptoms. No skin rash.  - He said, since COVID diagnosis 2 months ago, his GI is off, and have had diarrheas.     - In the ED, CT abdomen showed  -- Significant low-attenuation wall thickening in the right hemicolon with less pronounced changes in the remainder of the colon compatible with colitis.    - Lactic acid is normal. WBC is 15 thousand. He was given IVF    - ROS --- No headache. No dizziness. No weakness. No CP or SOB. No palpitations.  No urinary symptoms. No bleeding symptoms. No weight loss. Rest of 12 point ROS was reviewed and negative.       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  Created by Conversion    Replacement Utility updated for latest IMO load   Acute Pharyngitis Streptococcus   05/11/2015   Overview:     Formatting of this note might be different from the original.  Created by Conversion     Sore Throat   05/11/2015   Overview:     Formatting of this note might be different from the  "original.  Created by Conversion     Acute Pneumococcal Tonsillitis   05/11/2015   Overview:     Formatting of this note might be different from the original.  Created by Conversion     Acute Upper Respiratory Infection           Surgical History     No abdominal surgery    Family History      No IBD     Social History      .  Social History     Socioeconomic History     Marital status:      Spouse name: Not on file     Number of children: Not on file     Years of education: Not on file     Highest education level: Not on file   Occupational History     Not on file   Tobacco Use     Smoking status: Never     Smokeless tobacco: Never   Substance and Sexual Activity     Alcohol use: Yes     Comment: Alcoholic Drinks/day: occassional     Drug use: No     Sexual activity: Not on file   Other Topics Concern     Not on file   Social History Narrative     Not on file     Social Determinants of Health     Financial Resource Strain: Not on file   Food Insecurity: Not on file   Transportation Needs: Not on file   Physical Activity: Not on file   Stress: Not on file   Social Connections: Not on file   Intimate Partner Violence: Not on file   Housing Stability: Not on file          Allergies        Allergies   Allergen Reactions     Ambien [Zolpidem] Hives         Prior to Admission Medications      No current facility-administered medications on file prior to encounter.  calcium carbonate (TUMS) 500 MG chewable tablet, Take 1-2 chew tab by mouth 2 times daily as needed for heartburn  omeprazole (PRILOSEC OTC) 20 MG EC tablet, Take 20 mg by mouth daily as needed            Review of Systems     A 12 point comprehensive review of systems was negative except as noted above in HPI.    PHYSICAL EXAMINATION       Vitals      Vitals: /81   Pulse 97   Temp 98  F (36.7  C) (Temporal)   Resp 16   Ht 1.803 m (5' 11\")   Wt 97.5 kg (215 lb)   SpO2 98%   BMI 29.99 kg/m    BMI= Body mass index is 29.99 " kg/m .      Examination     General Appearance:  Alert, cooperative, no distress  Head:    Normocephalic, without obvious abnormality, atraumatic  EENT:  PERRL, conjunctiva/corneas clear, EOM's intact.   Neck:   Supple, symmetrical, trachea midline, no adenopathy; no NVE  Back:  Symmetric, no curvature, no CVA tenderness  Chest/Lungs: Clear to auscultation bilaterally, respirations unlabored, No tenderness or deformity. No abdominal breathing or use of accessory muscles.   Heart:    Regular rate and rhythm, S1 and S2 normal, no murmur, rub   or gallop  Abdomen: right sided abdominal pain, (+) NABS, not peritoneal on palpation. Not distended  Extremities:  Extremities normal, atraumatic, no swelling   Skin:  Skin color, texture, turgor normal, no rashes or lesion  Neurologic:  Awake and alert, No lateralizing or localizing sign s           Pertinent Lab     Results for orders placed or performed during the hospital encounter of 11/02/22   CT Abdomen Pelvis w Contrast    Impression    IMPRESSION:   1.  Significant low-attenuation wall thickening in the right hemicolon with less pronounced changes in the remainder of the colon compatible with colitis.    2.  Fatty liver.   Comprehensive metabolic panel   Result Value Ref Range    Sodium 138 136 - 145 mmol/L    Potassium 3.9 3.5 - 5.0 mmol/L    Chloride 101 98 - 107 mmol/L    Carbon Dioxide (CO2) 25 22 - 31 mmol/L    Anion Gap 12 5 - 18 mmol/L    Urea Nitrogen 8 8 - 22 mg/dL    Creatinine 0.85 0.70 - 1.30 mg/dL    Calcium 9.6 8.5 - 10.5 mg/dL    Glucose 101 70 - 125 mg/dL    Alkaline Phosphatase 87 45 - 120 U/L    AST 39 0 - 40 U/L    ALT 72 (H) 0 - 45 U/L    Protein Total 8.2 (H) 6.0 - 8.0 g/dL    Albumin 4.1 3.5 - 5.0 g/dL    Bilirubin Total 0.6 0.0 - 1.0 mg/dL    GFR Estimate >90 >60 mL/min/1.73m2   Lactic acid whole blood   Result Value Ref Range    Lactic Acid 1.6 0.7 - 2.0 mmol/L   CRP inflammation   Result Value Ref Range    CRP 3.3 (H) 0.0 - <0.8 mg/dL   CBC (+  platelets, no diff)   Result Value Ref Range    WBC Count 15.9 (H) 4.0 - 11.0 10e3/uL    RBC Count 5.78 4.40 - 5.90 10e6/uL    Hemoglobin 16.3 13.3 - 17.7 g/dL    Hematocrit 50.7 40.0 - 53.0 %    MCV 88 78 - 100 fL    MCH 28.2 26.5 - 33.0 pg    MCHC 32.1 31.5 - 36.5 g/dL    RDW 13.9 10.0 - 15.0 %    Platelet Count 250 150 - 450 10e3/uL           Pertinent Radiology

## 2022-11-04 LAB
ABO/RH(D): NORMAL
ANION GAP SERPL CALCULATED.3IONS-SCNC: 12 MMOL/L (ref 5–18)
ANTIBODY SCREEN: NEGATIVE
BASOPHILS # BLD AUTO: 0.1 10E3/UL (ref 0–0.2)
BASOPHILS NFR BLD AUTO: 0 %
BUN SERPL-MCNC: 6 MG/DL (ref 8–22)
CALCIUM SERPL-MCNC: 8.2 MG/DL (ref 8.5–10.5)
CHLORIDE BLD-SCNC: 103 MMOL/L (ref 98–107)
CO2 SERPL-SCNC: 23 MMOL/L (ref 22–31)
CREAT SERPL-MCNC: 0.74 MG/DL (ref 0.7–1.3)
EOSINOPHIL # BLD AUTO: 0.1 10E3/UL (ref 0–0.7)
EOSINOPHIL NFR BLD AUTO: 0 %
ERYTHROCYTE [DISTWIDTH] IN BLOOD BY AUTOMATED COUNT: 13.7 % (ref 10–15)
GFR SERPL CREATININE-BSD FRML MDRD: >90 ML/MIN/1.73M2
GLUCOSE BLD-MCNC: 92 MG/DL (ref 70–125)
HCT VFR BLD AUTO: 49.1 % (ref 40–53)
HGB BLD-MCNC: 15.8 G/DL (ref 13.3–17.7)
IMM GRANULOCYTES # BLD: 0.1 10E3/UL
IMM GRANULOCYTES NFR BLD: 0 %
LYMPHOCYTES # BLD AUTO: 3.1 10E3/UL (ref 0.8–5.3)
LYMPHOCYTES NFR BLD AUTO: 20 %
MCH RBC QN AUTO: 28.5 PG (ref 26.5–33)
MCHC RBC AUTO-ENTMCNC: 32.2 G/DL (ref 31.5–36.5)
MCV RBC AUTO: 89 FL (ref 78–100)
MONOCYTES # BLD AUTO: 1.4 10E3/UL (ref 0–1.3)
MONOCYTES NFR BLD AUTO: 9 %
NEUTROPHILS # BLD AUTO: 11 10E3/UL (ref 1.6–8.3)
NEUTROPHILS NFR BLD AUTO: 71 %
NRBC # BLD AUTO: 0 10E3/UL
NRBC BLD AUTO-RTO: 0 /100
O+P STL MICRO: NEGATIVE
PLATELET # BLD AUTO: 220 10E3/UL (ref 150–450)
POTASSIUM BLD-SCNC: 3.7 MMOL/L (ref 3.5–5)
RBC # BLD AUTO: 5.55 10E6/UL (ref 4.4–5.9)
SODIUM SERPL-SCNC: 138 MMOL/L (ref 136–145)
SPECIMEN EXPIRATION DATE: NORMAL
TRI STN SPEC: NORMAL
TRI STN SPEC: NORMAL
WBC # BLD AUTO: 15.6 10E3/UL (ref 4–11)

## 2022-11-04 PROCEDURE — 85025 COMPLETE CBC W/AUTO DIFF WBC: CPT | Performed by: STUDENT IN AN ORGANIZED HEALTH CARE EDUCATION/TRAINING PROGRAM

## 2022-11-04 PROCEDURE — 80048 BASIC METABOLIC PNL TOTAL CA: CPT | Performed by: STUDENT IN AN ORGANIZED HEALTH CARE EDUCATION/TRAINING PROGRAM

## 2022-11-04 PROCEDURE — G0378 HOSPITAL OBSERVATION PER HR: HCPCS

## 2022-11-04 PROCEDURE — 250N000011 HC RX IP 250 OP 636: Performed by: STUDENT IN AN ORGANIZED HEALTH CARE EDUCATION/TRAINING PROGRAM

## 2022-11-04 PROCEDURE — 120N000001 HC R&B MED SURG/OB

## 2022-11-04 PROCEDURE — 250N000011 HC RX IP 250 OP 636: Performed by: INTERNAL MEDICINE

## 2022-11-04 PROCEDURE — 96376 TX/PRO/DX INJ SAME DRUG ADON: CPT

## 2022-11-04 PROCEDURE — 36415 COLL VENOUS BLD VENIPUNCTURE: CPT | Performed by: STUDENT IN AN ORGANIZED HEALTH CARE EDUCATION/TRAINING PROGRAM

## 2022-11-04 PROCEDURE — 258N000003 HC RX IP 258 OP 636: Performed by: INTERNAL MEDICINE

## 2022-11-04 PROCEDURE — 99232 SBSQ HOSP IP/OBS MODERATE 35: CPT | Performed by: STUDENT IN AN ORGANIZED HEALTH CARE EDUCATION/TRAINING PROGRAM

## 2022-11-04 RX ORDER — CIPROFLOXACIN 500 MG/1
500 TABLET, FILM COATED ORAL EVERY 12 HOURS SCHEDULED
Status: DISCONTINUED | OUTPATIENT
Start: 2022-11-04 | End: 2022-11-04

## 2022-11-04 RX ORDER — KETOROLAC TROMETHAMINE 30 MG/ML
30 INJECTION, SOLUTION INTRAMUSCULAR; INTRAVENOUS EVERY 6 HOURS PRN
Status: DISCONTINUED | OUTPATIENT
Start: 2022-11-04 | End: 2022-11-05

## 2022-11-04 RX ADMIN — HYDROMORPHONE HYDROCHLORIDE 0.3 MG: 1 INJECTION, SOLUTION INTRAMUSCULAR; INTRAVENOUS; SUBCUTANEOUS at 12:46

## 2022-11-04 RX ADMIN — HYDROMORPHONE HYDROCHLORIDE 0.3 MG: 1 INJECTION, SOLUTION INTRAMUSCULAR; INTRAVENOUS; SUBCUTANEOUS at 08:36

## 2022-11-04 RX ADMIN — SODIUM CHLORIDE: 9 INJECTION, SOLUTION INTRAVENOUS at 18:40

## 2022-11-04 RX ADMIN — HYDROMORPHONE HYDROCHLORIDE 0.3 MG: 1 INJECTION, SOLUTION INTRAMUSCULAR; INTRAVENOUS; SUBCUTANEOUS at 18:39

## 2022-11-04 RX ADMIN — SODIUM CHLORIDE: 9 INJECTION, SOLUTION INTRAVENOUS at 01:45

## 2022-11-04 RX ADMIN — KETOROLAC TROMETHAMINE 30 MG: 30 INJECTION, SOLUTION INTRAMUSCULAR; INTRAVENOUS at 19:51

## 2022-11-04 RX ADMIN — HYDROMORPHONE HYDROCHLORIDE 0.3 MG: 1 INJECTION, SOLUTION INTRAMUSCULAR; INTRAVENOUS; SUBCUTANEOUS at 04:34

## 2022-11-04 RX ADMIN — KETOROLAC TROMETHAMINE 30 MG: 30 INJECTION, SOLUTION INTRAMUSCULAR; INTRAVENOUS at 13:38

## 2022-11-04 ASSESSMENT — ACTIVITIES OF DAILY LIVING (ADL)
DIFFICULTY_EATING/SWALLOWING: NO
CONCENTRATING,_REMEMBERING_OR_MAKING_DECISIONS_DIFFICULTY: NO
ADLS_ACUITY_SCORE: 37
ADLS_ACUITY_SCORE: 37
ADLS_ACUITY_SCORE: 35
CHANGE_IN_FUNCTIONAL_STATUS_SINCE_ONSET_OF_CURRENT_ILLNESS/INJURY: NO
ADLS_ACUITY_SCORE: 37
FALL_HISTORY_WITHIN_LAST_SIX_MONTHS: NO
DRESSING/BATHING_DIFFICULTY: NO
ADLS_ACUITY_SCORE: 37
ADLS_ACUITY_SCORE: 37
ADLS_ACUITY_SCORE: 35
WALKING_OR_CLIMBING_STAIRS_DIFFICULTY: NO
ADLS_ACUITY_SCORE: 22
ADLS_ACUITY_SCORE: 35
ADLS_ACUITY_SCORE: 22
WEAR_GLASSES_OR_BLIND: YES
ADLS_ACUITY_SCORE: 35
ADLS_ACUITY_SCORE: 35
VISION_MANAGEMENT: GLASSES
DOING_ERRANDS_INDEPENDENTLY_DIFFICULTY: NO
TOILETING_ISSUES: NO

## 2022-11-04 NOTE — PROGRESS NOTES
"GI PROGRESS NOTE  11/4/2022  Brennan Arellano  1980  [id number]    Subjective:   Still with pain and bloody diarrhea. 7/10 abdominal pain.      Objective:     Blood pressure 134/84, pulse 81, temperature 98.2  F (36.8  C), temperature source Oral, resp. rate 19, height 1.803 m (5' 11\"), weight 97.5 kg (215 lb), SpO2 95 %.    Body mass index is 29.99 kg/m .  Gen: NAD  Cardio: RRR  GI: Non-distended, BS positive, semi-firm, diffuse tenderness with minimal palpation and unvoluntary guarding  Neuro: alert and orientated   Skin: No jaundice    Laboratory:  BMP  Recent Labs   Lab 11/04/22  0801 11/03/22  0708 11/02/22  1809    140 138   POTASSIUM 3.7 3.7 3.9   CHLORIDE 103 104 101   MIC 8.2* 8.6 9.6   CO2 23 26 25   BUN 6* 9 8   CR 0.74 0.83 0.85   GLC 92 96 101     CBC  Recent Labs   Lab 11/04/22  0801 11/03/22  2350 11/03/22  1752   WBC 15.6* 15.8* 15.7*   RBC 5.55 5.22 5.24   HGB 15.8 15.2 14.9   HCT 49.1 46.0 46.0   MCV 89 88 88   MCH 28.5 29.1 28.4   MCHC 32.2 33.0 32.4   RDW 13.7 13.8 14.0    202 214     INRNo lab results found in last 7 days.   LFTs  Recent Labs   Lab Test 11/03/22  0708 11/02/22  1809   ALBUMIN 3.4* 4.1   BILITOTAL 1.0 0.6   ALT 52* 72*   AST 26 39   LIPASE  --  61*     Imaging:  EXAM: CT ABDOMEN PELVIS W CONTRAST  LOCATION: [facility]  DATE/TIME: 11/2/2022 8:31 PM                                          IMPRESSION:   1.  Significant low-attenuation wall thickening in the right hemicolon with less pronounced changes in the remainder of the colon compatible with colitis.     2.  Fatty liver.     Assessment:   1. Shiga-toxin 2 producing E. Coli colitis  42 year old male with multiple month history of intermittent diarrhea and arthralgias now with 2 days of acute onset abdominal pain and bloody stools. CT scan shows right sided colitis and enteric panel positive for STEC (shiga toxin 2). C. Difficile negative. Patient and his wife did get sick after " consuming fish sandwichs at [employer] (wife with headache and vomiting, no diarrhea). Treatment is supportive with IV fluids, and antiemetics. He does not have signs of HUS at this time, but we will monitor CBC, electrolytes and creatinine. Unfortunately, antibiotic therapy increases risk of HUS and have not been shown to reduce symptoms or complications. We also recommend limiting agents that reduce gut motility including IV ondansetron and narcotics.     Recommend colonoscopy in 6 weeks.      Plan:   1. IVF  2. Limit narcotics if able  3. Avoid IV ondansetron, patient currently has compazine PRN on board  4. Trend creatinine, electrolytes, CBC.   5. Colonoscopy in 6 weeks, our office with arrange.   6. Ok to advance diet as tolerated, right now patient happy with clears.                                                                          Cira Logan PA-C  Thank you for the opportunity to participate in the care of this patient.   Please feel free to call me with any questions or concerns.  Phone number [phone].

## 2022-11-04 NOTE — UTILIZATION REVIEW
Inpatient appropriate    Admission Status; Secondary Review Determination       Under the authority of the Utilization Management Committee, the utilization review process indicated a secondary review on the above patient. The review outcome is based on review of the medical records, discussions with staff, and applying clinical experience noted on the date of the review.     (x) Inpatient Status Appropriate - This patient's medical care is consistent with medical management for inpatient care and reasonable inpatient medical practice.     RATIONALE FOR DETERMINATION   42 year old male with no significant past medical history who presented to the hospital with 2 days of right abdominal pain along with bloody bowel movements.  He was found to have right hemicolon colitis. Enteric panel positive for Shiga toxin 2. Pt continued to have significant abdominal pain requiring frequent doses of IV narcotic.  He still has ongoing bloody diarrhea requiring IV fluid for dehydration.    At the time of admission with the information available to the attending physician more than 2 nights Hospital complex care was anticipated, based on patient risk of adverse outcome if treated as outpatient and complex care required. Inpatient admission is appropriate based on the Medicare guidelines.     The information on this document is developed by the utilization review team in order for the business office to ensure compliance. This only denotes the appropriateness of proper admission status and does not reflect the quality of care rendered.   The definitions of Inpatient Status and Observation Status used in making the determination above are those provided in the CMS Coverage Manual, Chapter 1 and Chapter 6, section 70.4.   Sincerely,   Shiva Sharp MD  Utilization Review  Physician Advisor  [facility].

## 2022-11-04 NOTE — PLAN OF CARE
Patient complaining of pain at beginning of shift, PRN norco given, pain to R. abdomen. NS running at 125. Clear liquid diet. IV compazine given for nausea. Patient having several maroon, dark, bloody stools this shift. Writer measured 500ml out in hat, fazal blood w/ no stool. Patient also incontinent of bloody stool x2, stating it just comes out when he falls asleep. Hgb remains stable, checking frequently. Patient feeling dizzy and is slight tachy (95). Checking vitals Q2h, rechecking hgb at 0000.

## 2022-11-04 NOTE — PLAN OF CARE
Problem: Plan of Care - These are the overarching goals to be used throughout the patient stay.    Goal: Optimal Comfort and Wellbeing  Outcome: Progressing  Intervention: Monitor Pain and Promote Comfort  Recent Flowsheet Documentation  Taken 11/4/2022 0200 by Vicki Perez RN  Pain Management Interventions: medication offered but refused  Taken 11/4/2022 0000 by Vicki Perez RN  Pain Management Interventions:   care clustered   emotional support   medication offered but refused   pain management plan reviewed with patient/caregiver   quiet environment facilitated   repositioned   rest   therapeutic presence  Taken 11/3/2022 2200 by Vicki Perez RN  Pain Management Interventions:   care clustered   emotional support   medication offered but refused   quiet environment facilitated   repositioned   rest   therapeutic presence  Taken 11/3/2022 2000 by Vicki Perez RN  Pain Management Interventions:   care clustered   medication offered but refused   pain management plan reviewed with patient/caregiver   quiet environment facilitated   repositioned   rest   therapeutic presence    Patient's vitals are stable. Temp 98.9-101.3. Rates right sided abdominal pain 4-6/10. Patient does not want to take dilaudid or hydrocodone. Tylenol administered x1. Pt. Up independently to bathroom, bloody stool. Will continue to monitor.     TOSHA Perez RN

## 2022-11-04 NOTE — PROGRESS NOTES
[facility] MEDICINE PROGRESS NOTE      Identification/Summary: Brennan Arellano is a 42 year old male with no significant past medical history who presented to the hospital with 2 days of right abdominal pain along with bloody bowel movements.  He was found to have infectious colitis.      #Abdominal pain  #Acute infectious colitis.  -He presented with abdominal pain started around 2 days prior to his presentation, subsequently he developed watery diarrhea which then turned to bloody diarrhea.  -CT abdomen showed right hemicolon colitis.  -Stool studies positive for STEC (Shiga toxin 2)  -No signs of HUS.  -Gastroenterology following.    Plan:  [] Supportive care with IV fluid.  [] Avoid antibiotic as it could increase the risk of HUS.  [] Start NSAIDs for better pain control.  [] Continue Dilaudid 0.3 mg every 4 hours as needed.  [] Continue Compazine as needed and avoid IV ondansetron.  [] Advance diet as tolerated.  [] Colonoscopy in 6 weeks.        Diet: Clear Liquid Diet  DVT Prophylaxis:  Low Risk/Ambulatory with no VTE prophylaxis indicated  Code Status: Full Code    Anticipated possible discharge in 3 days to 5 once abdominal pain is controlled, diarrhea improved, no further episode of fever.      Disposition Plan Home     Expected Discharge Date: 11/08/2022                The patient's care was discussed with the Bedside Nurse and Patient.    BRYANNA CHU MD  [facility]  [facility]  Phone: #[phone]    Interval History/Subjective:  Continues to have intermittent abdominal pain and bloody bowel movements.  He has some intermittent nausea but no vomiting.  He continues to have some fever and chills.  He denies any chest pain or shortness of breath.  He has decreased appetite.    Physical Exam/Objective:  Temp:  [98.2  F (36.8  C)-101.3  F (38.5  C)] 99.1  F (37.3  C)  Pulse:  [80-99] 93  Resp:  [18-20] 19  BP: (118-143)/()  143/88  SpO2:  [91 %-100 %] 95 %  Body mass index is 29.99 kg/m .     Physical Exam  Constitutional:       General: He is not in acute distress.     Appearance: He is not ill-appearing.   Cardiovascular:      Rate and Rhythm: Normal rate and regular rhythm.      Pulses: Normal pulses.      Heart sounds: Normal heart sounds. No murmur heard.  Pulmonary:      Effort: Pulmonary effort is normal. No respiratory distress.      Breath sounds: Normal breath sounds. No wheezing.   Abdominal:      General: Abdomen is flat. There is distension.      Palpations: Abdomen is soft.      Tenderness: There is abdominal tenderness.      Comments: Right upper and lower quadrants tenderness on light and deep palpation.   Musculoskeletal:         General: No swelling.   Skin:     General: Skin is warm and dry.   Neurological:      General: No focal deficit present.      Mental Status: He is alert. Mental status is at baseline.   Psychiatric:         Mood and Affect: Mood normal.         Behavior: Behavior normal.         Data reviewed today: I personally reviewed all new medications, labs, imaging/diagnostics reports over the past 24 hours. Pertinent findings include:    Imaging:   No results found for this or any previous visit (from the past 24 hour(s)).    Labs:  Most Recent 3 CBC's:  Recent Labs   Lab Test 11/04/22  0801 11/03/22  2350 11/03/22  1752   WBC 15.6* 15.8* 15.7*   HGB 15.8 15.2 14.9   MCV 89 88 88    202 214     Most Recent 3 BMP's:  Recent Labs   Lab Test 11/04/22  0801 11/03/22  0708 11/02/22  1809    140 138   POTASSIUM 3.7 3.7 3.9   CHLORIDE 103 104 101   CO2 23 26 25   BUN 6* 9 8   CR 0.74 0.83 0.85   ANIONGAP 12 10 12   MIC 8.2* 8.6 9.6   GLC 92 96 101     Most Recent 2 LFT's:  Recent Labs   Lab Test 11/03/22  0708 11/02/22  1809   AST 26 39   ALT 52* 72*   ALKPHOS 77 87   BILITOTAL 1.0 0.6       Medications:   Personally Reviewed.  Medications     sodium chloride 125 mL/hr at 11/04/22 0145

## 2022-11-05 LAB
ANION GAP SERPL CALCULATED.3IONS-SCNC: 8 MMOL/L (ref 5–18)
BASOPHILS # BLD MANUAL: 0 10E3/UL (ref 0–0.2)
BASOPHILS NFR BLD MANUAL: 0 %
BUN SERPL-MCNC: 6 MG/DL (ref 8–22)
CALCIUM SERPL-MCNC: 8.1 MG/DL (ref 8.5–10.5)
CHLORIDE BLD-SCNC: 107 MMOL/L (ref 98–107)
CO2 SERPL-SCNC: 25 MMOL/L (ref 22–31)
CREAT SERPL-MCNC: 0.69 MG/DL (ref 0.7–1.3)
EOSINOPHIL # BLD MANUAL: 0.1 10E3/UL (ref 0–0.7)
EOSINOPHIL NFR BLD MANUAL: 1 %
ERYTHROCYTE [DISTWIDTH] IN BLOOD BY AUTOMATED COUNT: 13.4 % (ref 10–15)
GFR SERPL CREATININE-BSD FRML MDRD: >90 ML/MIN/1.73M2
GLUCOSE BLD-MCNC: 81 MG/DL (ref 70–125)
HCT VFR BLD AUTO: 43.7 % (ref 40–53)
HGB BLD-MCNC: 14.1 G/DL (ref 13.3–17.7)
LYMPHOCYTES # BLD MANUAL: 2 10E3/UL (ref 0.8–5.3)
LYMPHOCYTES NFR BLD MANUAL: 16 %
MAGNESIUM SERPL-MCNC: 1.7 MG/DL (ref 1.8–2.6)
MCH RBC QN AUTO: 28 PG (ref 26.5–33)
MCHC RBC AUTO-ENTMCNC: 32.3 G/DL (ref 31.5–36.5)
MCV RBC AUTO: 87 FL (ref 78–100)
MONOCYTES # BLD MANUAL: 1 10E3/UL (ref 0–1.3)
MONOCYTES NFR BLD MANUAL: 8 %
NEUTROPHILS # BLD MANUAL: 9.5 10E3/UL (ref 1.6–8.3)
NEUTROPHILS NFR BLD MANUAL: 75 %
NRBC # BLD AUTO: 0.1 10E3/UL
NRBC BLD MANUAL-RTO: 1 %
PLAT MORPH BLD: ABNORMAL
PLATELET # BLD AUTO: 193 10E3/UL (ref 150–450)
POTASSIUM BLD-SCNC: 3.3 MMOL/L (ref 3.5–5)
RBC # BLD AUTO: 5.04 10E6/UL (ref 4.4–5.9)
RBC MORPH BLD: ABNORMAL
SODIUM SERPL-SCNC: 140 MMOL/L (ref 136–145)
WBC # BLD AUTO: 12.6 10E3/UL (ref 4–11)

## 2022-11-05 PROCEDURE — 80048 BASIC METABOLIC PNL TOTAL CA: CPT | Performed by: STUDENT IN AN ORGANIZED HEALTH CARE EDUCATION/TRAINING PROGRAM

## 2022-11-05 PROCEDURE — 85027 COMPLETE CBC AUTOMATED: CPT | Performed by: STUDENT IN AN ORGANIZED HEALTH CARE EDUCATION/TRAINING PROGRAM

## 2022-11-05 PROCEDURE — 83735 ASSAY OF MAGNESIUM: CPT | Performed by: STUDENT IN AN ORGANIZED HEALTH CARE EDUCATION/TRAINING PROGRAM

## 2022-11-05 PROCEDURE — 85007 BL SMEAR W/DIFF WBC COUNT: CPT | Performed by: STUDENT IN AN ORGANIZED HEALTH CARE EDUCATION/TRAINING PROGRAM

## 2022-11-05 PROCEDURE — 258N000003 HC RX IP 258 OP 636: Performed by: INTERNAL MEDICINE

## 2022-11-05 PROCEDURE — 120N000001 HC R&B MED SURG/OB

## 2022-11-05 PROCEDURE — 99232 SBSQ HOSP IP/OBS MODERATE 35: CPT | Performed by: STUDENT IN AN ORGANIZED HEALTH CARE EDUCATION/TRAINING PROGRAM

## 2022-11-05 PROCEDURE — 250N000013 HC RX MED GY IP 250 OP 250 PS 637: Performed by: INTERNAL MEDICINE

## 2022-11-05 PROCEDURE — 36415 COLL VENOUS BLD VENIPUNCTURE: CPT | Performed by: STUDENT IN AN ORGANIZED HEALTH CARE EDUCATION/TRAINING PROGRAM

## 2022-11-05 PROCEDURE — 250N000013 HC RX MED GY IP 250 OP 250 PS 637: Performed by: STUDENT IN AN ORGANIZED HEALTH CARE EDUCATION/TRAINING PROGRAM

## 2022-11-05 PROCEDURE — 250N000011 HC RX IP 250 OP 636: Performed by: STUDENT IN AN ORGANIZED HEALTH CARE EDUCATION/TRAINING PROGRAM

## 2022-11-05 PROCEDURE — 250N000011 HC RX IP 250 OP 636: Performed by: INTERNAL MEDICINE

## 2022-11-05 RX ORDER — KETOROLAC TROMETHAMINE 10 MG/1
10 TABLET, FILM COATED ORAL EVERY 4 HOURS PRN
Status: DISCONTINUED | OUTPATIENT
Start: 2022-11-05 | End: 2022-11-06 | Stop reason: HOSPADM

## 2022-11-05 RX ADMIN — SODIUM CHLORIDE: 9 INJECTION, SOLUTION INTRAVENOUS at 20:52

## 2022-11-05 RX ADMIN — KETOROLAC TROMETHAMINE 10 MG: 10 TABLET, FILM COATED ORAL at 21:25

## 2022-11-05 RX ADMIN — ACETAMINOPHEN 650 MG: 325 TABLET, FILM COATED ORAL at 00:59

## 2022-11-05 RX ADMIN — HYDROMORPHONE HYDROCHLORIDE 0.3 MG: 1 INJECTION, SOLUTION INTRAMUSCULAR; INTRAVENOUS; SUBCUTANEOUS at 00:57

## 2022-11-05 RX ADMIN — KETOROLAC TROMETHAMINE 30 MG: 30 INJECTION, SOLUTION INTRAMUSCULAR; INTRAVENOUS at 02:02

## 2022-11-05 RX ADMIN — SODIUM CHLORIDE: 9 INJECTION, SOLUTION INTRAVENOUS at 02:04

## 2022-11-05 RX ADMIN — SODIUM CHLORIDE: 9 INJECTION, SOLUTION INTRAVENOUS at 11:39

## 2022-11-05 RX ADMIN — ACETAMINOPHEN 650 MG: 325 TABLET, FILM COATED ORAL at 15:55

## 2022-11-05 RX ADMIN — KETOROLAC TROMETHAMINE 30 MG: 30 INJECTION, SOLUTION INTRAMUSCULAR; INTRAVENOUS at 07:55

## 2022-11-05 ASSESSMENT — ACTIVITIES OF DAILY LIVING (ADL)
ADLS_ACUITY_SCORE: 22

## 2022-11-05 NOTE — PLAN OF CARE
Problem: Pain Acute  Goal: Optimal Pain Control and Function  Outcome: Adequate for Care Transition  Intervention: Develop Pain Management Plan    Intervention: Prevent or Manage Pain       Goal Outcome Evaluation:    A&Ox4. Ambulates Independently. Showered this morning. Reported improvement in pain today. Tylenol given for aches and low grade fever with good relief. No blood noted in BM per pt. Tolerated low fiber diet. IVF infusing. Isolation precautions maintained.

## 2022-11-05 NOTE — PROGRESS NOTES
[facility] MEDICINE PROGRESS NOTE      Identification/Summary: Brennan Arellano is a 42 year old male with no significant past medical history who presented to the hospital with 2 days of right abdominal pain along with bloody bowel movements.  He was found to have infectious colitis.      #Abdominal pain  #Acute infectious colitis.  -He presented with abdominal pain started around 2 days prior to his presentation, subsequently he developed watery diarrhea which then turned to bloody diarrhea.  -CT abdomen showed right hemicolon colitis.  -Stool studies positive for STEC (Shiga toxin 2)  -No signs of HUS.  -Gastroenterology following.  -Symptoms has improved over the last 24 hours.    Plan:  [] Supportive care with IV fluid.  [] Avoid antibiotic as it could increase the risk of HUS.  [] Switch Toradol IV to p.o. stop opioids.  [] Continue Compazine as needed and avoid IV ondansetron.  [] Advance diet as tolerated.  [] Plan for discharge tomorrow as long as he is able to tolerate his diet.  [] Colonoscopy in 6 weeks.        Diet: Advance Diet as Tolerated: Regular Diet Adult; Low Fiber  DVT Prophylaxis:  Low Risk/Ambulatory with no VTE prophylaxis indicated  Code Status: Full Code    Anticipated possible discharge in 1 days to 2 once abdominal pain is controlled, diarrhea resolves, no further episode of fever.  Able to tolerate diet.      Disposition Plan Home     Expected Discharge Date: 11/06/2022,  3:00 PM              The patient's care was discussed with the Bedside Nurse and Patient.    BRYANNA CHU MD  [facility]  [facility]  Phone: #[phone]    Interval History/Subjective:  Patient is feeling better today.  He reported 1 episodes of nonbloody diarrhea.  His abdominal pain has significantly improved.  He has decreased appetite but tolerating his clear liquid diet.  He denies any chest pain or shortness of breath.  He denies any fever  or chills.    Physical Exam/Objective:  Temp:  [97.8  F (36.6  C)-98.8  F (37.1  C)] 98.1  F (36.7  C)  Pulse:  [69-86] 81  Resp:  [16-20] 16  BP: (117-141)/(71-92) 131/71  SpO2:  [94 %-98 %] 98 %  Body mass index is 29.99 kg/m .     Physical Exam  Constitutional:       General: He is not in acute distress.     Appearance: Normal appearance. He is not ill-appearing.   Cardiovascular:      Rate and Rhythm: Normal rate and regular rhythm.      Pulses: Normal pulses.      Heart sounds: Normal heart sounds. No murmur heard.  Pulmonary:      Effort: Pulmonary effort is normal. No respiratory distress.      Breath sounds: Normal breath sounds. No wheezing.   Abdominal:      General: Abdomen is flat. There is no distension.      Palpations: Abdomen is soft.      Tenderness: There is no guarding.      Comments: Significant provement of right abdominal tenderness.   Musculoskeletal:         General: No swelling.   Skin:     General: Skin is warm and dry.   Neurological:      General: No focal deficit present.      Mental Status: He is alert. Mental status is at baseline.   Psychiatric:         Mood and Affect: Mood normal.         Behavior: Behavior normal.         Data reviewed today: I personally reviewed all new medications, labs, imaging/diagnostics reports over the past 24 hours. Pertinent findings include:    Imaging:   No results found for this or any previous visit (from the past 24 hour(s)).    Labs:  Most Recent 3 CBC's:  Recent Labs   Lab Test 11/05/22  0757 11/04/22  0801 11/03/22  2350   WBC 12.6* 15.6* 15.8*   HGB 14.1 15.8 15.2   MCV 87 89 88    220 202     Most Recent 3 BMP's:  Recent Labs   Lab Test 11/05/22  0757 11/04/22  0801 11/03/22  0708    138 140   POTASSIUM 3.3* 3.7 3.7   CHLORIDE 107 103 104   CO2 25 23 26   BUN 6* 6* 9   CR 0.69* 0.74 0.83   ANIONGAP 8 12 10   MIC 8.1* 8.2* 8.6   GLC 81 92 96     Most Recent 2 LFT's:  Recent Labs   Lab Test 11/03/22  0708 11/02/22  1809   AST 26 39    ALT 52* 72*   ALKPHOS 77 87   BILITOTAL 1.0 0.6       Medications:   Personally Reviewed.  Medications     sodium chloride 125 mL/hr at 11/05/22 1137

## 2022-11-05 NOTE — PROGRESS NOTES
"GI PROGRESS NOTE  11/4/2022  Brennan MARCUS Rushford  1980  [id number]    Subjective:   Pain persists but improving. Had one liquid stool overnight with less blood. One very small brown stool this am. Tolerating clear liquids. Not wanting much more this morning. Denies n/v.      Objective:     Blood pressure (!) 140/92, pulse 78, temperature 98  F (36.7  C), temperature source Oral, resp. rate 16, height 1.803 m (5' 11\"), weight 97.5 kg (215 lb), SpO2 96 %.    Body mass index is 29.99 kg/m .  Gen: NAD  Cardio: RRR  GI: Non-distended, BS positive, mild RUQ and RLQ tenderness, no rebound or guarding  Neuro: alert and orientated   Skin: No jaundice    Laboratory:  BMP  Recent Labs   Lab 11/05/22  0757 11/04/22  0801 11/03/22  0708    138 140   POTASSIUM 3.3* 3.7 3.7   CHLORIDE 107 103 104   MIC 8.1* 8.2* 8.6   CO2 25 23 26   BUN 6* 6* 9   CR 0.69* 0.74 0.83   GLC 81 92 96     CBC  Recent Labs   Lab 11/05/22  0757 11/04/22  0801 11/03/22  2350   WBC 12.6* 15.6* 15.8*   RBC 5.04 5.55 5.22   HGB 14.1 15.8 15.2   HCT 43.7 49.1 46.0   MCV 87 89 88   MCH 28.0 28.5 29.1   MCHC 32.3 32.2 33.0   RDW 13.4 13.7 13.8    220 202     INRNo lab results found in last 7 days.   LFTs  Recent Labs   Lab Test 11/03/22  0708 11/02/22  1809   ALBUMIN 3.4* 4.1   BILITOTAL 1.0 0.6   ALT 52* 72*   AST 26 39   LIPASE  --  61*     Imaging:  EXAM: CT ABDOMEN PELVIS W CONTRAST  LOCATION: [facility]  DATE/TIME: 11/2/2022 8:31 PM                                          IMPRESSION:   1.  Significant low-attenuation wall thickening in the right hemicolon with less pronounced changes in the remainder of the colon compatible with colitis.     2.  Fatty liver.     Assessment:   1. Shiga-toxin 2 producing E. Coli colitis  42 year old male with multiple month history of intermittent diarrhea and arthralgias now with 2 days of acute onset abdominal pain and bloody stools. CT scan shows right sided colitis and enteric " panel positive for STEC (shiga toxin 2). C. Difficile negative. Patient and his wife did get sick after consuming fish sandwichs at [employer] (wife with headache and vomiting, no diarrhea). Unfortunately, antibiotic therapy increases risk of HUS and have not been shown to reduce symptoms or complications.     Leukocytosis improving. HGB normal. Creatinine unremarkable today. K slightly low this morning. Clinically improving noticing less blood, less stool frequency, and improving pain.     Plan:   1. IVF.   2. Limit narcotics if able due to decreased gut motility.  3. Avoid IV ondansetron for same reason as #2. Compazine preferred.  4. Trend creatinine, electrolytes, CBC.   5. Clear liquids, but can ADAT to low fiber. Patient only wanting clears this am, which is reasonable.  6. Monitor stool output.   7. Colonoscopy in 6 weeks, our office with arrange.     Paola Cifuentes, PAC  [facility] ([facility])

## 2022-11-05 NOTE — PLAN OF CARE
Problem: Plan of Care - These are the overarching goals to be used throughout the patient stay.    Goal: Optimal Comfort and Wellbeing  Outcome: Progressing  Intervention: Monitor Pain and Promote Comfort  Recent Flowsheet Documentation  Taken 11/5/2022 0000 by Vicki Perez RN  Pain Management Interventions:   medication (see MAR)   ambulation/increased activity   care clustered   emotional support   pain management plan reviewed with patient/caregiver   quiet environment facilitated   repositioned   rest   therapeutic presence  Taken 11/4/2022 2000 by Vicki Perez RN  Pain Management Interventions:   medication (see MAR)   care clustered   emotional support   pain management plan reviewed with patient/caregiver   quiet environment facilitated   repositioned   rest   therapeutic presence    Patient's vitals are stable, afebrile. Rates abdominal pain 3-7/10. Dilaudid and Toradol administered with relief. Pt. Up independently to the bathroom. Loose stools becoming more brown in color instead of bright red blood. Normal Saline at 125 ml/hr. Will continue to monitor.     TOSHA Perez RN

## 2022-11-05 NOTE — PROGRESS NOTES
Pt is alert and oriented and able to make needs known.  Call light and all belongings within reach.  Family involved and supportive.  Encouraged patient to call at any time if needing something for pain control.  Pain meds/times posted on whiteboard in patient's room for him to see.  Pain improved throughout the day. Toradol very effective.  Educated patient on reducing frequency of Dilaudid.  Pt tolerating clears.  Continues to have frequent loose bloody/brown stools.  Hgb stable.  IV fluids infusing continuously.  Pt up in the hallways ambulating x 1 this shift.  Pt tolerated it fairly well.

## 2022-11-05 NOTE — PLAN OF CARE
Problem: Plan of Care - These are the overarching goals to be used throughout the patient stay.    Goal: Optimal Comfort and Wellbeing  Intervention: Monitor Pain and Promote Comfort  Recent Flowsheet Documentation  Taken 11/4/2022 1909 by Janet Leiva RN  Pain Management Interventions: medication (see MAR)  Taken 11/4/2022 1839 by Janet Leiva RN  Pain Management Interventions: medication (see MAR)  Taken 11/4/2022 1438 by Janet Leiva RN  Pain Management Interventions: ambulation/increased activity  Taken 11/4/2022 1316 by Janet Leiva RN  Pain Management Interventions: distraction  Taken 11/4/2022 1307 by Janet Leiva RN  Pain Management Interventions:   declines   distraction  Taken 11/4/2022 1246 by Janet Leiva RN  Pain Management Interventions: medication (see MAR)  Taken 11/4/2022 0906 by Janet Leiva RN  Pain Management Interventions: distraction  Taken 11/4/2022 0836 by Janet Leiva RN  Pain Management Interventions:   medication (see MAR)   care clustered   emotional support   pain management plan reviewed with patient/caregiver   quiet environment facilitated   repositioned   rest   therapeutic presence   Goal Outcome Evaluation:

## 2022-11-06 ENCOUNTER — NURSE TRIAGE (OUTPATIENT)
Dept: NURSING | Facility: CLINIC | Age: 42
End: 2022-11-06

## 2022-11-06 VITALS
HEART RATE: 88 BPM | HEIGHT: 71 IN | WEIGHT: 219.14 LBS | RESPIRATION RATE: 16 BRPM | TEMPERATURE: 99 F | OXYGEN SATURATION: 95 % | DIASTOLIC BLOOD PRESSURE: 81 MMHG | SYSTOLIC BLOOD PRESSURE: 141 MMHG | BODY MASS INDEX: 30.68 KG/M2

## 2022-11-06 LAB
ANION GAP SERPL CALCULATED.3IONS-SCNC: 10 MMOL/L (ref 5–18)
BASOPHILS # BLD AUTO: 0 10E3/UL (ref 0–0.2)
BASOPHILS NFR BLD AUTO: 0 %
BUN SERPL-MCNC: 4 MG/DL (ref 8–22)
CALCIUM SERPL-MCNC: 8.6 MG/DL (ref 8.5–10.5)
CHLORIDE BLD-SCNC: 106 MMOL/L (ref 98–107)
CO2 SERPL-SCNC: 26 MMOL/L (ref 22–31)
CREAT SERPL-MCNC: 0.73 MG/DL (ref 0.7–1.3)
EOSINOPHIL # BLD AUTO: 0.2 10E3/UL (ref 0–0.7)
EOSINOPHIL NFR BLD AUTO: 2 %
ERYTHROCYTE [DISTWIDTH] IN BLOOD BY AUTOMATED COUNT: 13.2 % (ref 10–15)
GFR SERPL CREATININE-BSD FRML MDRD: >90 ML/MIN/1.73M2
GLUCOSE BLD-MCNC: 90 MG/DL (ref 70–125)
HCT VFR BLD AUTO: 42 % (ref 40–53)
HGB BLD-MCNC: 13.9 G/DL (ref 13.3–17.7)
IMM GRANULOCYTES # BLD: 0.1 10E3/UL
IMM GRANULOCYTES NFR BLD: 1 %
LYMPHOCYTES # BLD AUTO: 2.9 10E3/UL (ref 0.8–5.3)
LYMPHOCYTES NFR BLD AUTO: 29 %
MAGNESIUM SERPL-MCNC: 1.9 MG/DL (ref 1.8–2.6)
MCH RBC QN AUTO: 29 PG (ref 26.5–33)
MCHC RBC AUTO-ENTMCNC: 33.1 G/DL (ref 31.5–36.5)
MCV RBC AUTO: 88 FL (ref 78–100)
MONOCYTES # BLD AUTO: 0.7 10E3/UL (ref 0–1.3)
MONOCYTES NFR BLD AUTO: 7 %
NEUTROPHILS # BLD AUTO: 6.1 10E3/UL (ref 1.6–8.3)
NEUTROPHILS NFR BLD AUTO: 61 %
NRBC # BLD AUTO: 0 10E3/UL
NRBC BLD AUTO-RTO: 0 /100
PLAT MORPH BLD: ABNORMAL
PLATELET # BLD AUTO: 247 10E3/UL (ref 150–450)
POTASSIUM BLD-SCNC: 3.1 MMOL/L (ref 3.5–5)
RBC # BLD AUTO: 4.8 10E6/UL (ref 4.4–5.9)
RBC MORPH BLD: ABNORMAL
SODIUM SERPL-SCNC: 142 MMOL/L (ref 136–145)
VARIANT LYMPHS BLD QL SMEAR: PRESENT
WBC # BLD AUTO: 10 10E3/UL (ref 4–11)

## 2022-11-06 PROCEDURE — 36415 COLL VENOUS BLD VENIPUNCTURE: CPT | Performed by: STUDENT IN AN ORGANIZED HEALTH CARE EDUCATION/TRAINING PROGRAM

## 2022-11-06 PROCEDURE — 250N000013 HC RX MED GY IP 250 OP 250 PS 637: Performed by: INTERNAL MEDICINE

## 2022-11-06 PROCEDURE — 99239 HOSP IP/OBS DSCHRG MGMT >30: CPT | Performed by: STUDENT IN AN ORGANIZED HEALTH CARE EDUCATION/TRAINING PROGRAM

## 2022-11-06 PROCEDURE — 258N000003 HC RX IP 258 OP 636: Performed by: INTERNAL MEDICINE

## 2022-11-06 PROCEDURE — 250N000013 HC RX MED GY IP 250 OP 250 PS 637: Performed by: STUDENT IN AN ORGANIZED HEALTH CARE EDUCATION/TRAINING PROGRAM

## 2022-11-06 PROCEDURE — 85025 COMPLETE CBC W/AUTO DIFF WBC: CPT | Performed by: STUDENT IN AN ORGANIZED HEALTH CARE EDUCATION/TRAINING PROGRAM

## 2022-11-06 PROCEDURE — 82310 ASSAY OF CALCIUM: CPT | Performed by: STUDENT IN AN ORGANIZED HEALTH CARE EDUCATION/TRAINING PROGRAM

## 2022-11-06 PROCEDURE — 83735 ASSAY OF MAGNESIUM: CPT | Performed by: STUDENT IN AN ORGANIZED HEALTH CARE EDUCATION/TRAINING PROGRAM

## 2022-11-06 PROCEDURE — 250N000009 HC RX 250: Performed by: PHYSICIAN ASSISTANT

## 2022-11-06 PROCEDURE — 250N000013 HC RX MED GY IP 250 OP 250 PS 637: Performed by: PHYSICIAN ASSISTANT

## 2022-11-06 RX ORDER — POTASSIUM CHLORIDE 1500 MG/1
40 TABLET, EXTENDED RELEASE ORAL ONCE
Status: COMPLETED | OUTPATIENT
Start: 2022-11-06 | End: 2022-11-06

## 2022-11-06 RX ORDER — PROCHLORPERAZINE MALEATE 10 MG
10 TABLET ORAL EVERY 6 HOURS PRN
Qty: 10 TABLET | Refills: 0 | Status: SHIPPED | OUTPATIENT
Start: 2022-11-06

## 2022-11-06 RX ORDER — ACETAMINOPHEN 325 MG/1
650 TABLET ORAL EVERY 4 HOURS PRN
Qty: 30 TABLET | Refills: 0 | Status: SHIPPED | OUTPATIENT
Start: 2022-11-06

## 2022-11-06 RX ADMIN — ALUMINUM HYDROXIDE, MAGNESIUM HYDROXIDE, AND SIMETHICONE 30 ML: 200; 200; 20 SUSPENSION ORAL at 09:41

## 2022-11-06 RX ADMIN — POTASSIUM CHLORIDE 40 MEQ: 1500 TABLET, EXTENDED RELEASE ORAL at 11:21

## 2022-11-06 RX ADMIN — ACETAMINOPHEN 650 MG: 325 TABLET, FILM COATED ORAL at 08:28

## 2022-11-06 RX ADMIN — SODIUM CHLORIDE: 9 INJECTION, SOLUTION INTRAVENOUS at 04:37

## 2022-11-06 ASSESSMENT — ACTIVITIES OF DAILY LIVING (ADL)
ADLS_ACUITY_SCORE: 22

## 2022-11-06 NOTE — PROGRESS NOTES
"GI PROGRESS NOTE  11/4/2022  Brennan Arellano  1980  [id number]    Subjective:   Reports about 2 hours ago began having left shoulder sharp pain, but has transitioned to substernal and LUQ, thinks it may be heartburn. Describes it as sharp. Has slight SOB. No increase in pain with breathing. Otherwise abdominal pain improving. Had loose nonbloody stool this am.  Does not like the food but did have low fiber diet last evening and tolerated.  Objective:     Blood pressure 139/83, pulse 77, temperature 98.6  F (37  C), temperature source Oral, resp. rate 16, height 1.803 m (5' 11\"), weight 99.4 kg (219 lb 2.2 oz), SpO2 96 %.    Body mass index is 30.56 kg/m .  Gen: NAD, alert/oriented  Cardio: RRR  GI: Non-distended, BS positive, nontender, no rebound or guarding  Neuro: alert and orientated   Skin: No jaundice    Laboratory:  BMP  Recent Labs   Lab 11/05/22  0757 11/04/22  0801 11/03/22  0708    138 140   POTASSIUM 3.3* 3.7 3.7   CHLORIDE 107 103 104   MIC 8.1* 8.2* 8.6   CO2 25 23 26   BUN 6* 6* 9   CR 0.69* 0.74 0.83   GLC 81 92 96     CBC  Recent Labs   Lab 11/05/22  0757 11/04/22  0801 11/03/22  2350   WBC 12.6* 15.6* 15.8*   RBC 5.04 5.55 5.22   HGB 14.1 15.8 15.2   HCT 43.7 49.1 46.0   MCV 87 89 88   MCH 28.0 28.5 29.1   MCHC 32.3 32.2 33.0   RDW 13.4 13.7 13.8    220 202     INRNo lab results found in last 7 days.   LFTs  Recent Labs   Lab Test 11/03/22  0708 11/02/22  1809   ALBUMIN 3.4* 4.1   BILITOTAL 1.0 0.6   ALT 52* 72*   AST 26 39   LIPASE  --  61*     Imaging:  EXAM: CT ABDOMEN PELVIS W CONTRAST  LOCATION: [facility]  DATE/TIME: 11/2/2022 8:31 PM                                          IMPRESSION:   1.  Significant low-attenuation wall thickening in the right hemicolon with less pronounced changes in the remainder of the colon compatible with colitis.     2.  Fatty liver.     Assessment:   1. Shiga-toxin 2 producing E. Coli colitis  42 year old male with " multiple month history of intermittent diarrhea and arthralgias now with 2 days of acute onset abdominal pain and bloody stools. CT scan shows right sided colitis and enteric panel positive for STEC (shiga toxin 2). C. Difficile negative. Patient and his wife did get sick after consuming fish sandwichs at [employer] (wife with headache and vomiting, no diarrhea). Unfortunately, antibiotic therapy increases risk of HUS and have not been shown to reduce symptoms or complications.     Overall clinically improving but with sharp left shoulder, chest/LUQ pain today. Possibly GERD related but cannot rule out cardiac. Cardiac exam unremarkable. Labs not drawn yet today. No fevers.    Plan:   1. GI cocktail.   2. Asked nursing staff to contact hospital service in case any additional workup is felt necessary.  3. Low fiber diet as tolerated.   3. Limit narcotics if able due to decreased gut motility.  3. Await labs today.   4. Colonoscopy in 6 weeks, our office with arrange.     Ok to discharge today from GI perspective. Will sign off, please call with questions.     Paola Cifuentes, PAC  [facility] ([facility])

## 2022-11-06 NOTE — PROGRESS NOTES
NSG DISCHARGE NOTE    Patient discharged to home at 12:36 PM via ambulation. Accompanied by spouse and staff. Discharge instructions reviewed with patient, opportunity offered to ask questions. Prescriptions sent to patient's preferred pharmacy upon discharge. All belongings sent with patient.    Chayito Tovar RN

## 2022-11-06 NOTE — DISCHARGE SUMMARY
[facility] MEDICINE  DISCHARGE SUMMARY     Primary Care Physician: System, Provider Not In  Admission Date: 11/2/2022   Discharge Provider: BRYANNA CHU MD Discharge Date: 11/6/2022   Diet:   Active Diet and Nourishment Order   Procedures     Advance Diet as Tolerated: Regular Diet Adult; Low Fiber       Code Status: Full Code   Activity: DCACTIVITY: Activity as tolerated        Condition at Discharge: Stable     REASON FOR PRESENTATION(See Admission Note for Details)   Abdominal pain and diarrhea.    PRINCIPAL & ACTIVE DISCHARGE DIAGNOSES     Principal Problem:    Colitis      PENDING LABS     Unresulted Labs Ordered in the Past 30 Days of this Admission     No orders found from 10/3/2022 to 11/3/2022.          RECOMMENDATIONS TO OUTPATIENT PROVIDER FOR F/U VISIT   -CBC, BMP and magnesium.    DISPOSITION     Home    SUMMARY OF HOSPITAL COURSE:    Brennan Arellano is a 42 year old male with no significant past medical history who started to experience abdominal pain on 10/31/2022 followed by watery diarrhea.  He presented to the hospital on 11/2/2022 with right abdominal pain along with bloody diarrhea.  Stool studies revealed Shiga toxin 2 (STEC).  No signs of HUS.  Evaluated by gastroenterology and received supportive care with IV fluid and pain medication.  Symptoms significantly improved within 24 to 48 hours.  His abdominal pain has almost resolved, he is able to tolerate his diet without any nausea or vomiting.  His diarrhea is resolving without any further bloody diarrhea.       #Acute infectious colitis.  -He presented with abdominal pain started around 2 days prior to his presentation, subsequently he developed watery diarrhea which then turned to bloody diarrhea.  -CT abdomen showed right hemicolon colitis.  -Stool studies positive for STEC (Shiga toxin 2)  -No signs of HUS.  -Symptoms has almost completely resolved and patient is able to tolerate his diet since  yesterday.  -No further episode of fever.  -Leukocytosis has resolved.  -Creatinine is baseline.  -Patient has mild hypokalemia with potassium of 3.1.     Plan:  [] Give potassium prior to discharge  [] Patient was educated about the importance of adequate hydration over the next few days.  [] Follow-up with primary care physician within the next 5 to 7 days, at that time his BMP should be checked to check for any hypokalemia.  [] Pain management with Tylenol.  [] Compazine for the next few days for any nausea or vomiting.  [] Follow-up with gastroenterology as an outpatient for colonoscopy in 6 weeks.    #Fatty liver  -CT abdomen during this hospitalization showed fatty liver.    Plan:  [] Follow-up with primary care physician for further evaluation and management.      #GERD  [] Continue home medications.    Discharge Medications with Med changes:     Current Discharge Medication List      CONTINUE these medications which have NOT CHANGED    Details   calcium carbonate (TUMS) 500 MG chewable tablet Take 1-2 chew tab by mouth 2 times daily as needed for heartburn      omeprazole (PRILOSEC OTC) 20 MG EC tablet Take 20 mg by mouth daily as needed                   Consults       GASTROENTEROLOGY IP CONSULT    Immunizations given this encounter     Most Recent Immunizations   Administered Date(s) Administered     COVID-19,PF,[place] 04/01/2021     Influenza Vaccine IM > 6 months Valent IIV4 (Alfuria,Fluzone) 09/14/2022               SIGNIFICANT IMAGING FINDINGS     Results for orders placed or performed during the hospital encounter of 11/02/22   CT Abdomen Pelvis w Contrast    Impression    IMPRESSION:   1.  Significant low-attenuation wall thickening in the right hemicolon with less pronounced changes in the remainder of the colon compatible with colitis.    2.  Fatty liver.       SIGNIFICANT LABORATORY FINDINGS     Most Recent 3 CBC's:Recent Labs   Lab Test 11/06/22  0944 11/05/22  0757 11/04/22  0801   WBC 10.0  12.6* 15.6*   HGB 13.9 14.1 15.8   MCV 88 87 89    193 220     Most Recent 3 BMP's:Recent Labs   Lab Test 11/06/22  0944 11/05/22  0757 11/04/22  0801    140 138   POTASSIUM 3.1* 3.3* 3.7   CHLORIDE 106 107 103   CO2 26 25 23   BUN 4* 6* 6*   CR 0.73 0.69* 0.74   ANIONGAP 10 8 12   MIC 8.6 8.1* 8.2*   GLC 90 81 92         Discharge Orders     No discharge procedures on file.    Examination   Physical Exam   Temp:  [98.1  F (36.7  C)-99.5  F (37.5  C)] 98.6  F (37  C)  Pulse:  [77-83] 83  Resp:  [16] 16  BP: (120-157)/(71-91) 157/91  SpO2:  [95 %-98 %] 95 %  Wt Readings from Last 1 Encounters:   11/06/22 99.4 kg (219 lb 2.2 oz)       Physical Exam  Constitutional:       Appearance: Normal appearance.   HENT:      Mouth/Throat:      Mouth: Mucous membranes are moist.   Cardiovascular:      Rate and Rhythm: Normal rate and regular rhythm.      Pulses: Normal pulses.      Heart sounds: Normal heart sounds. No murmur heard.  Pulmonary:      Breath sounds: Normal breath sounds. No wheezing.   Abdominal:      General: Abdomen is flat. There is no distension.      Palpations: Abdomen is soft.   Skin:     General: Skin is warm and dry.   Neurological:      General: No focal deficit present.      Mental Status: He is alert and oriented to person, place, and time.   Psychiatric:         Mood and Affect: Mood normal.         Behavior: Behavior normal.           Please see EMR for more detailed significant labs, imaging, consultant notes etc.    IBRYANNA MD, personally saw the patient today and spent greater than 30 minutes discharging this patient.    RBYANNA CHU MD  [facility]    CC:System, Provider Not In

## 2022-11-06 NOTE — PLAN OF CARE
Problem: Plan of Care - These are the overarching goals to be used throughout the patient stay.    Goal: Plan of Care Review  Description: The Plan of Care Review/Shift note should be completed every shift.  The Outcome Evaluation is a brief statement about your assessment that the patient is improving, declining, or no change.  This information will be displayed automatically on your shift note.  Outcome: Progressing    Pt rating right/upper abd pain 5/10 this shift. Denies need for prn pain meds. VSS. Remains on IVMF. Pt calls appropriately and makes needs known.

## 2022-11-06 NOTE — PLAN OF CARE
"Patient is alert and oriented x4. He is eager to return home. He is up independently. Reports pain 1/10, some heartburn discomfort earlier today, resolved with oral medication. Reports BM's are much better. On RA.     BP (!) 141/81 (BP Location: Right arm)   Pulse 88   Temp 99  F (37.2  C) (Oral)   Resp 16   Ht 1.803 m (5' 11\")   Wt 99.4 kg (219 lb 2.2 oz)   SpO2 95%   BMI 30.56 kg/m     "

## 2022-11-06 NOTE — PLAN OF CARE
Problem: Pain Acute  Goal: Optimal Pain Control and Function  Outcome: Progressing  Intervention: Develop Pain Management Plan  Recent Flowsheet Documentation  Taken 11/5/2022 2050 by Louann Salazar RN  Pain Management Interventions: (Medication requested from pharmacy)   other (see comments)   emotional support  Intervention: Prevent or Manage Pain  Recent Flowsheet Documentation  Taken 11/5/2022 2050 by Louann Salazar RN  Medication Review/Management: medications reviewed   Goal Outcome Evaluation:             Vital signs stable. Patient reports intermittent sharp stabbing pains, reports adequate pain control with PO Toradol. Patient tolerating a regular low-fiber diet. No reports of any nausea. No stool since this morning. Patient up independently.

## 2022-11-07 ENCOUNTER — APPOINTMENT (OUTPATIENT)
Dept: CT IMAGING | Facility: CLINIC | Age: 42
End: 2022-11-07
Attending: EMERGENCY MEDICINE
Payer: COMMERCIAL

## 2022-11-07 ENCOUNTER — HOSPITAL ENCOUNTER (EMERGENCY)
Facility: CLINIC | Age: 42
Discharge: HOME | End: 2022-11-07
Attending: EMERGENCY MEDICINE | Admitting: EMERGENCY MEDICINE
Payer: COMMERCIAL

## 2022-11-07 VITALS
OXYGEN SATURATION: 97 % | SYSTOLIC BLOOD PRESSURE: 142 MMHG | HEART RATE: 91 BPM | RESPIRATION RATE: 20 BRPM | DIASTOLIC BLOOD PRESSURE: 89 MMHG | TEMPERATURE: 99.2 F

## 2022-11-07 DIAGNOSIS — A49.8 STEC (SHIGA TOXIN-PRODUCING ESCHERICHIA COLI) INFECTION: ICD-10-CM

## 2022-11-07 DIAGNOSIS — R07.9 CHEST PAIN, UNSPECIFIED TYPE: ICD-10-CM

## 2022-11-07 DIAGNOSIS — E86.0 DEHYDRATION: ICD-10-CM

## 2022-11-07 DIAGNOSIS — E87.6 HYPOKALEMIA: ICD-10-CM

## 2022-11-07 LAB
ALBUMIN SERPL-MCNC: 3.2 G/DL (ref 3.5–5)
ALP SERPL-CCNC: 81 U/L (ref 45–120)
ALT SERPL W P-5'-P-CCNC: 26 U/L (ref 0–45)
ANION GAP SERPL CALCULATED.3IONS-SCNC: 11 MMOL/L (ref 5–18)
APTT PPP: 28 SECONDS (ref 22–38)
AST SERPL W P-5'-P-CCNC: 18 U/L (ref 0–40)
ATRIAL RATE - MUSE: 95 BPM
BASOPHILS # BLD AUTO: 0.1 10E3/UL (ref 0–0.2)
BASOPHILS NFR BLD AUTO: 1 %
BILIRUB SERPL-MCNC: 0.6 MG/DL (ref 0–1)
BUN SERPL-MCNC: 4 MG/DL (ref 8–22)
CALCIUM SERPL-MCNC: 9.2 MG/DL (ref 8.5–10.5)
CHLORIDE BLD-SCNC: 102 MMOL/L (ref 98–107)
CO2 SERPL-SCNC: 27 MMOL/L (ref 22–31)
CREAT SERPL-MCNC: 0.82 MG/DL (ref 0.7–1.3)
DIASTOLIC BLOOD PRESSURE - MUSE: NORMAL MMHG
EOSINOPHIL # BLD AUTO: 0.1 10E3/UL (ref 0–0.7)
EOSINOPHIL NFR BLD AUTO: 1 %
ERYTHROCYTE [DISTWIDTH] IN BLOOD BY AUTOMATED COUNT: 13.2 % (ref 10–15)
GFR SERPL CREATININE-BSD FRML MDRD: >90 ML/MIN/1.73M2
GLUCOSE BLD-MCNC: 133 MG/DL (ref 70–125)
HCT VFR BLD AUTO: 43.7 % (ref 40–53)
HGB BLD-MCNC: 14.4 G/DL (ref 13.3–17.7)
IMM GRANULOCYTES # BLD: 0.1 10E3/UL
IMM GRANULOCYTES NFR BLD: 1 %
INR PPP: 1.13 (ref 0.85–1.15)
INTERPRETATION ECG - MUSE: NORMAL
LACTATE SERPL-SCNC: 1.4 MMOL/L (ref 0.7–2)
LIPASE SERPL-CCNC: 117 U/L (ref 0–52)
LYMPHOCYTES # BLD AUTO: 3.4 10E3/UL (ref 0.8–5.3)
LYMPHOCYTES NFR BLD AUTO: 32 %
MCH RBC QN AUTO: 28 PG (ref 26.5–33)
MCHC RBC AUTO-ENTMCNC: 33 G/DL (ref 31.5–36.5)
MCV RBC AUTO: 85 FL (ref 78–100)
MONOCYTES # BLD AUTO: 1 10E3/UL (ref 0–1.3)
MONOCYTES NFR BLD AUTO: 9 %
NEUTROPHILS # BLD AUTO: 6.1 10E3/UL (ref 1.6–8.3)
NEUTROPHILS NFR BLD AUTO: 56 %
NRBC # BLD AUTO: 0 10E3/UL
NRBC BLD AUTO-RTO: 0 /100
P AXIS - MUSE: 48 DEGREES
PLAT MORPH BLD: ABNORMAL
PLATELET # BLD AUTO: 288 10E3/UL (ref 150–450)
POTASSIUM BLD-SCNC: 3 MMOL/L (ref 3.5–5)
PR INTERVAL - MUSE: 146 MS
PROT SERPL-MCNC: 7.5 G/DL (ref 6–8)
QRS DURATION - MUSE: 94 MS
QT - MUSE: 360 MS
QTC - MUSE: 452 MS
R AXIS - MUSE: -22 DEGREES
RBC # BLD AUTO: 5.14 10E6/UL (ref 4.4–5.9)
RBC MORPH BLD: ABNORMAL
SODIUM SERPL-SCNC: 140 MMOL/L (ref 136–145)
SYSTOLIC BLOOD PRESSURE - MUSE: NORMAL MMHG
T AXIS - MUSE: 23 DEGREES
TROPONIN I SERPL-MCNC: 0.09 NG/ML (ref 0–0.29)
TROPONIN I SERPL-MCNC: 0.09 NG/ML (ref 0–0.29)
VARIANT LYMPHS BLD QL SMEAR: PRESENT
VENTRICULAR RATE- MUSE: 95 BPM
WBC # BLD AUTO: 10.7 10E3/UL (ref 4–11)

## 2022-11-07 PROCEDURE — 258N000003 HC RX IP 258 OP 636: Performed by: EMERGENCY MEDICINE

## 2022-11-07 PROCEDURE — 83605 ASSAY OF LACTIC ACID: CPT | Performed by: NURSE PRACTITIONER

## 2022-11-07 PROCEDURE — 36415 COLL VENOUS BLD VENIPUNCTURE: CPT | Performed by: EMERGENCY MEDICINE

## 2022-11-07 PROCEDURE — 96366 THER/PROPH/DIAG IV INF ADDON: CPT

## 2022-11-07 PROCEDURE — 85025 COMPLETE CBC W/AUTO DIFF WBC: CPT | Performed by: NURSE PRACTITIONER

## 2022-11-07 PROCEDURE — 85610 PROTHROMBIN TIME: CPT | Performed by: NURSE PRACTITIONER

## 2022-11-07 PROCEDURE — 84484 ASSAY OF TROPONIN QUANT: CPT | Performed by: NURSE PRACTITIONER

## 2022-11-07 PROCEDURE — 84484 ASSAY OF TROPONIN QUANT: CPT | Mod: 91 | Performed by: EMERGENCY MEDICINE

## 2022-11-07 PROCEDURE — 250N000013 HC RX MED GY IP 250 OP 250 PS 637: Performed by: EMERGENCY MEDICINE

## 2022-11-07 PROCEDURE — 83690 ASSAY OF LIPASE: CPT | Performed by: EMERGENCY MEDICINE

## 2022-11-07 PROCEDURE — 74177 CT ABD & PELVIS W/CONTRAST: CPT

## 2022-11-07 PROCEDURE — 250N000011 HC RX IP 250 OP 636: Performed by: EMERGENCY MEDICINE

## 2022-11-07 PROCEDURE — C9113 INJ PANTOPRAZOLE SODIUM, VIA: HCPCS | Performed by: EMERGENCY MEDICINE

## 2022-11-07 PROCEDURE — 96375 TX/PRO/DX INJ NEW DRUG ADDON: CPT

## 2022-11-07 PROCEDURE — 84484 ASSAY OF TROPONIN QUANT: CPT | Performed by: EMERGENCY MEDICINE

## 2022-11-07 PROCEDURE — 87040 BLOOD CULTURE FOR BACTERIA: CPT | Performed by: EMERGENCY MEDICINE

## 2022-11-07 PROCEDURE — 96365 THER/PROPH/DIAG IV INF INIT: CPT | Mod: 59

## 2022-11-07 PROCEDURE — 36415 COLL VENOUS BLD VENIPUNCTURE: CPT | Performed by: NURSE PRACTITIONER

## 2022-11-07 PROCEDURE — 71275 CT ANGIOGRAPHY CHEST: CPT

## 2022-11-07 PROCEDURE — 85730 THROMBOPLASTIN TIME PARTIAL: CPT | Performed by: NURSE PRACTITIONER

## 2022-11-07 PROCEDURE — 99285 EMERGENCY DEPT VISIT HI MDM: CPT | Mod: 25

## 2022-11-07 PROCEDURE — 80053 COMPREHEN METABOLIC PANEL: CPT | Performed by: NURSE PRACTITIONER

## 2022-11-07 PROCEDURE — 93005 ELECTROCARDIOGRAM TRACING: CPT | Performed by: NURSE PRACTITIONER

## 2022-11-07 RX ORDER — OMEPRAZOLE 20 MG/1
TABLET, DELAYED RELEASE ORAL
Qty: 45 TABLET | Refills: 0 | Status: SHIPPED | OUTPATIENT
Start: 2022-11-07

## 2022-11-07 RX ORDER — OXYCODONE HYDROCHLORIDE 5 MG/1
5 TABLET ORAL EVERY 6 HOURS PRN
Qty: 12 TABLET | Refills: 0 | Status: SHIPPED | OUTPATIENT
Start: 2022-11-07 | End: 2022-11-10

## 2022-11-07 RX ORDER — SUCRALFATE ORAL 1 G/10ML
1 SUSPENSION ORAL ONCE
Status: COMPLETED | OUTPATIENT
Start: 2022-11-07 | End: 2022-11-07

## 2022-11-07 RX ORDER — OXYCODONE AND ACETAMINOPHEN 5; 325 MG/1; MG/1
1 TABLET ORAL ONCE
Status: COMPLETED | OUTPATIENT
Start: 2022-11-07 | End: 2022-11-07

## 2022-11-07 RX ORDER — IOPAMIDOL 755 MG/ML
100 INJECTION, SOLUTION INTRAVASCULAR ONCE
Status: COMPLETED | OUTPATIENT
Start: 2022-11-07 | End: 2022-11-07

## 2022-11-07 RX ORDER — SUCRALFATE 1 G/1
1 TABLET ORAL 4 TIMES DAILY
Qty: 120 TABLET | Refills: 0 | Status: SHIPPED | OUTPATIENT
Start: 2022-11-07 | End: 2022-12-07

## 2022-11-07 RX ORDER — ACETAMINOPHEN 325 MG/1
325 TABLET ORAL ONCE
Status: COMPLETED | OUTPATIENT
Start: 2022-11-07 | End: 2022-11-07

## 2022-11-07 RX ORDER — POTASSIUM CHLORIDE 7.45 MG/ML
10 INJECTION INTRAVENOUS ONCE
Status: COMPLETED | OUTPATIENT
Start: 2022-11-07 | End: 2022-11-07

## 2022-11-07 RX ORDER — SODIUM CHLORIDE 9 MG/ML
INJECTION, SOLUTION INTRAVENOUS CONTINUOUS
Status: DISCONTINUED | OUTPATIENT
Start: 2022-11-07 | End: 2022-11-07 | Stop reason: HOSPADM

## 2022-11-07 RX ORDER — MORPHINE SULFATE 4 MG/ML
4 INJECTION, SOLUTION INTRAMUSCULAR; INTRAVENOUS ONCE
Status: COMPLETED | OUTPATIENT
Start: 2022-11-07 | End: 2022-11-07

## 2022-11-07 RX ADMIN — POTASSIUM CHLORIDE 10 MEQ: 7.46 INJECTION, SOLUTION INTRAVENOUS at 12:30

## 2022-11-07 RX ADMIN — PANTOPRAZOLE SODIUM 40 MG: 40 INJECTION, POWDER, FOR SOLUTION INTRAVENOUS at 11:54

## 2022-11-07 RX ADMIN — IOPAMIDOL 100 ML: 755 INJECTION, SOLUTION INTRAVENOUS at 12:36

## 2022-11-07 RX ADMIN — SUCRALFATE 1 G: 1 SUSPENSION ORAL at 12:28

## 2022-11-07 RX ADMIN — ACETAMINOPHEN 325 MG: 325 TABLET, FILM COATED ORAL at 15:45

## 2022-11-07 RX ADMIN — MORPHINE SULFATE 4 MG: 4 INJECTION, SOLUTION INTRAMUSCULAR; INTRAVENOUS at 14:11

## 2022-11-07 RX ADMIN — OXYCODONE AND ACETAMINOPHEN 1 TABLET: 5; 325 TABLET ORAL at 15:45

## 2022-11-07 RX ADMIN — SODIUM CHLORIDE: 9 INJECTION, SOLUTION INTRAVENOUS at 14:08

## 2022-11-07 RX ADMIN — FAMOTIDINE 20 MG: 10 INJECTION, SOLUTION INTRAVENOUS at 11:53

## 2022-11-07 RX ADMIN — SODIUM CHLORIDE 1000 ML: 9 INJECTION, SOLUTION INTRAVENOUS at 12:29

## 2022-11-07 ASSESSMENT — ENCOUNTER SYMPTOMS
FEVER: 1
SHORTNESS OF BREATH: 1

## 2022-11-07 ASSESSMENT — ACTIVITIES OF DAILY LIVING (ADL)
ADLS_ACUITY_SCORE: 35
ADLS_ACUITY_SCORE: 35

## 2022-11-07 NOTE — DISCHARGE INSTRUCTIONS
Try and prop up your bed so that your shoulders are about 30-45 degrees up with sleeping.  Use the carafate to coat your stomach and help prevent ulcers.  Take the antacid twice a day for about 2 weeks again to help to prevent ulcers.    Keep well hydrated.  Take your potassium with food as that can worsen stomach acid.  Continue with plan to follow up with your primary care clinic but I would like you seen in 2-3 days for a recheck, and then with your gastroenterologist as they discussed in a couple of weeks.    In the meantime return here as needed if your breathing worsens at all, or if you are not able to keep hydrated or you become progressively more weak for repeat evaluation.

## 2022-11-07 NOTE — TELEPHONE ENCOUNTER
Nurse Triage SBAR    Is this a 2nd Level Triage? No    Situation/Background: Patient was discharged today from hospital, 11/2/22 - 11/6/22. Patient was admitted for GI concerns. Patient states he had similar pain in chest in the hospital, was given a GI cocktail, which helped with the pain but did not completely resolve it. Patient states the main reason he is calling is that he has developed a fever since arriving back home.     Assessment:   Heartburn - stabbing pain in the chest since this morning  If sitting straight up, the pain in the chest lessens, not as intense  Two hours before the call patient had Fever 101.9, took acetaminophen 1,000mg, now temperature 100.4  Sweat on face, patient states his whole body is sweating due to fever    Recommendation: Per disposition, Call 911 now. Patient refused disposition. Patient states he will go to  tomorrow. Reinforced disposition to call 911 now. Advised patient to call back with any new or worsening symptoms. Patient verbalized understanding and agrees with plan.    Protocol Recommended Disposition: Call 911    Stefany Gifford RN on 11/6/2022 at 6:12 PM  [facility]    Reason for Disposition    Visible sweat on face or sweat dripping down face    [1] Fever AND [2] no signs of serious infection or localizing symptoms (all other triage questions negative)    Additional Information    Negative: SEVERE difficulty breathing (e.g., struggling for each breath, speaks in single words)    Negative: Difficult to awaken or acting confused (e.g., disoriented, slurred speech)    Negative: Shock suspected (e.g., cold/pale/clammy skin, too weak to stand, low BP, rapid pulse)    Negative: Passed out (i.e., lost consciousness, collapsed and was not responding)    Negative: [1] Chest pain lasts > 5 minutes AND [2] age > 44    Negative: [1] Chest pain lasts > 5 minutes AND [2] age > 30 AND [3] one or more cardiac risk factors (e.g., diabetes, high blood  pressure, high cholesterol, smoker, or strong family history of heart disease)    Negative: [1] Chest pain lasts > 5 minutes AND [2] history of heart disease (i.e., angina, heart attack, heart failure, bypass surgery, takes nitroglycerin)    Negative: [1] Chest pain lasts > 5 minutes AND [2] described as crushing, pressure-like, or heavy    Negative: Heart beating < 50 beats per minute OR > 140 beats per minute    Negative: Shock suspected (e.g., cold/pale/clammy skin, too weak to stand, low BP, rapid pulse)    Negative: Difficult to awaken or acting confused (e.g., disoriented, slurred speech)    Negative: [1] Difficulty breathing AND [2] bluish lips, tongue or face    Negative: New-onset rash with multiple purple (or blood-colored) spots or dots    Negative: Sounds like a life-threatening emergency to the triager    Negative: Fever in a cancer patient who is currently (or recently) receiving chemotherapy or radiation therapy, or cancer patient who has metastatic or end-stage cancer and is receiving palliative care    Negative: Pregnant    Negative: Postpartum (from 0 to 6 weeks after delivery)    Negative: Fever onset within 24 hours of receiving vaccine    Negative: [1] Fever AND [2] within 14 days of COVID-19 Exposure    Negative: Other symptom is present, see that guideline (e.g., symptoms of cough, runny nose, sore throat, earache, abdominal pain, diarrhea, vomiting)    Negative: [1] Headache AND [2] stiff neck (can't touch chin to chest)    Negative: Difficulty breathing    Negative: IV Drug Use (IVDU)    Negative: [1] Drinking very little AND [2] dehydration suspected (e.g., no urine > 12 hours, very dry mouth, very lightheaded)    Negative: Widespread rash and cause unknown    Negative: Patient sounds very sick or weak to the triager  (Exception: mild weakness and hasn't taken fever medicine)    Negative: Fever > 104 F (40 C)    Negative: [1] Fever > 101 F (38.3 C) AND [2] age > 60 years    Negative: [1]  Fever > 100.0 F (37.8 C) AND [2] bedridden (e.g., nursing home patient, CVA, chronic illness, recovering from surgery)    Negative: [1] Fever > 100.0 F (37.8 C) AND [2] indwelling urinary catheter (e.g., Ybarra, Coude)    Negative: [1] Fever > 100.0 F (37.8 C) AND [2] has port (portacath), central line, or PICC line    Negative: [1] Fever > 100.0 F (37.8 C) AND [2] diabetes mellitus or weak immune system (e.g., HIV positive, cancer chemo, splenectomy, organ transplant, chronic steroids)    Negative: [1] Fever > 100.0 F (37.8 C) AND [2] surgery in the last month    Negative: Transplant patient (e.g., kidney, liver, lung, heart)    Negative: Severe chills (i.e., feeling extremely cold WITH shaking chills)    Negative: Fever present > 3 days (72 hours)    Negative: [1] Fever > 100.0 F (37.8 C) AND [2] foreign travel to a developing country in the last month    Negative: [1] Intermittent fever > 100.0 F (37.8 C) AND [2] lasts > 3 weeks    Protocols used: CHEST PAIN-A-AH, FEVER-A-AH

## 2022-11-07 NOTE — ED PROVIDER NOTES
Emergency Department Encounter     Evaluation Date & Time:   2022 11:12 AM    CHIEF COMPLAINT:  Chest Pain      Triage Note:          FINAL IMPRESSION:    ICD-10-CM    1. Chest pain, unspecified type  R07.9     positional      2. Dehydration  E86.0       3. Hypokalemia  E87.6       4. STEC (Shiga toxin-producing Escherichia coli) infection  A49.8           Impression and Plan     ED COURSE & MEDICAL DECISION MAKIN:10 AM I introduced myself to the patient, obtained patient history, performed a physical exam, and discussed plan for ED workup including potential diagnostic laboratory/imaging studies and interventions.  I reviewed his inpatient notes and consult note from gi as well as discharge plan and lab results.  12:32 PM CT Abdomen pelvis w contrast exam and CT chest pulmonary embolism w contrast started  1:11 PM CT results out  2:11 PM Pain medication morphine (4 MG) was given.   2:42 PM I rechecked the patient and updated them on laboratory and imaging results.  2:57 PM I rechecked the patient and updated them on laboratory and imaging results.  4:37 PM patient updated on trop, he is happy to go home with the medication changes as discussed.      ED Course as of 22 1637   Mon 2022   1126 Patient's inpatient and outpatient charts are reviewed.  He is here today for chest discomfort, and lightheadedness.  No syncope.  He does still have some residual abdominal discomfort, but its not tender as it was when he was previously admitted.  He is in the process of recovering from E. coli with Shiga toxin.  He had no previous hemolytic uremic syndrome with this but was hospitalized with colitis.  Currently with chest pain he was diaphoretic so the initial EKG had a wandering baseline and cannot be interpreted so second EKG was done once he was laying down in a bed and did not show any evidence of ischemia or arrhythmia.  He is a bit tachycardic, and he does note fevers yesterday and today so  we will do blood cultures now along with a repeat lactic acid to rule out sepsis or bacteremia.  Certainly this may be gastritis and GERD since it is worse when he lays flat at night, but may also be pericarditis related to his recent illness and hospitalization.  Less likely pericardial effusion.  However, with the hospitalization chest pain and shortness of breath with lightheadedness also need to consider the possibility of PE.  I do not see any extremity signs of DVT to suggest this, but will do troponin to look for signs of strain, and CAT scan of the chest if his kidney function is normal.  Because we are giving contrast and within the differential is still a possibility of ulcer perforation, will continue CT through the abdomen which will also additionally see if there is been progression of the colitis or other involvement such as a vasculitis.   1215 Potassium is slightly low likely from GI losses.  Replacing now.  Kidney function is good so can do CT with contrast.   1500 Patient's CAT scan of his chest shows no evidence for PE.  No worsening of his colitis which in fact and appears improved.  His lipase is slightly elevated but again he has had gastrointestinal disease so mild inflammation is expected and certainly with an unremarkable CAT scan treatment would still be rehydration and pain control as needed.  His description of his pain worse when he lays down it certainly makes me think of pancreatitis, gastritis, or pericarditis.  We will repeat his troponin and if that is unremarkable he prefers discharge home and will continue pain management and fluid resuscitation at home. With acetaminophen prn.  The cardiac silhouette on ct is normal without pericardial effusion and his lungs are clear.    Patient prefers discharge home and I see no safety issues to discharge if he returns if he worsens, becomes more short of breath or weak.   1630 Trop is not increasing.  Okay to discharge home.       At the  conclusion of the encounter I discussed the results of all the tests and the disposition. The questions were answered. The patient or family acknowledged understanding and was agreeable with the care plan.          0 minutes of critical care time        MEDICATIONS GIVEN IN THE EMERGENCY DEPARTMENT:  Medications   sodium chloride 0.9% infusion (0 mLs Intravenous Stopped 11/7/22 1636)   famotidine (PEPCID) injection 20 mg (20 mg Intravenous Given 11/7/22 1153)   pantoprazole (PROTONIX) IV push injection 40 mg (40 mg Intravenous Given 11/7/22 1154)   sucralfate (CARAFATE) suspension 1 g (1 g Oral Given 11/7/22 1228)   potassium chloride 10 mEq in 100 mL sterile water infusion (0 mEq Intravenous Stopped 11/7/22 1405)   0.9% sodium chloride BOLUS (0 mLs Intravenous Stopped 11/7/22 1410)   iopamidol (ISOVUE-370) solution 100 mL (100 mLs Intravenous Given 11/7/22 1236)   morphine (PF) injection 4 mg (4 mg Intravenous Given 11/7/22 1411)   oxyCODONE-acetaminophen (PERCOCET) 5-325 MG per tablet 1 tablet (1 tablet Oral Given 11/7/22 1545)   acetaminophen (TYLENOL) tablet 325 mg (325 mg Oral Given 11/7/22 1545)       NEW PRESCRIPTIONS STARTED AT TODAY'S ED VISIT:  New Prescriptions    OXYCODONE (ROXICODONE) 5 MG TABLET    Take 1 tablet (5 mg) by mouth every 6 hours as needed for pain       HPI     HPI     Brennan Arellano is a 42 year old male with a medical history of guillain barre syndrome and colitis who presents to this ED via walk in for evaluation of chest pain.     Per chart review, the patient was admitted to [facility] from 11/2/2022 to 11/6/2022 (4 days). The patient presented to the hospital with right abdominal pain along with bloody diarrhea. Stool studies revealed Shiga toxin 2 (STEC).  No signs of HUS.  Evaluated by gastroenterology and received supportive care with IV fluid and pain medication.  Symptoms significantly improved within 24 to 48 hours. His abdominal pain has almost resolved, he is able to  "tolerate his diet without any nausea or vomiting.  His diarrhea is resolving without any further bloody diarrhea. CT abdomen showed right hemicolon colitis. Patient was discharged with plan for outpatient follow-up with primary care provider within the next 5-7 days and follow-up with gastroenterology as an outpatient for colonoscopy in 6 weeks.    The patient was admitted to [facility] on 11/2/2022 and started feeling chest pain yesterday in his room around 4:30 AM. He also noted having some left shoulder discomfort and informed his GI doctor who then gave him pain medication. After he was discharged yesterday, he noted that the chest pain worsened with breathing and says it was \"excruciating\". He also had a fever of 102 at home and took tylenol with some relief. He felt short of breath along with some tunneled vision today and presented to the ED today after feeling short of breath on the way to his car from the clinic. Patient denies any loss of consciousness at home. Patient did not have any bowel movements today and notes that he hasn't eaten much in the past week.     REVIEW OF SYSTEMS:  Review of Systems   Constitutional: Positive for fever.   Eyes: Positive for visual disturbance (Tunneled vision).   Respiratory: Positive for shortness of breath.    Cardiovascular: Positive for chest pain.   Neurological:        Negative for loss of consciousness   All other systems reviewed and are negative.    remainder of systems are all otherwise negative.        Medical History     History reviewed. No pertinent past medical history.    History reviewed. No pertinent surgical history.    No family history on file.    Social History     Tobacco Use     Smoking status: Never     Smokeless tobacco: Never   Substance Use Topics     Alcohol use: Yes     Comment: Alcoholic Drinks/day: occassional     Drug use: No       omeprazole (PRILOSEC OTC) 20 MG EC tablet  oxyCODONE (ROXICODONE) 5 MG tablet  acetaminophen " (TYLENOL) 325 MG tablet  calcium carbonate (TUMS) 500 MG chewable tablet  prochlorperazine (COMPAZINE) 10 MG tablet        Physical Exam     First Vitals:  Patient Vitals for the past 24 hrs:   BP Temp Temp src Pulse Resp SpO2   11/07/22 1600 (!) 142/89 -- -- 91 20 97 %   11/07/22 1400 (!) 162/94 -- -- 86 -- 97 %   11/07/22 1345 -- -- -- 86 -- 98 %   11/07/22 1330 (!) 179/106 -- -- 89 -- 98 %   11/07/22 1315 -- -- -- 93 -- 97 %   11/07/22 1300 (!) 165/100 -- -- 91 16 98 %   11/07/22 1250 -- -- -- 96 -- 98 %   11/07/22 1245 -- -- -- 94 16 99 %   11/07/22 1240 -- -- -- 110 -- (!) 76 %   11/07/22 1230 (!) 155/106 -- -- 90 16 98 %   11/07/22 1220 -- -- -- 89 -- 98 %   11/07/22 1215 -- -- -- 94 16 98 %   11/07/22 1210 -- -- -- 93 -- 97 %   11/07/22 1200 (!) 157/93 -- -- 93 16 97 %   11/07/22 1145 -- -- -- 88 16 97 %   11/07/22 1130 (!) 160/107 -- -- 102 16 96 %   11/07/22 1115 (!) 153/98 -- -- -- -- --   11/07/22 1032 (!) 157/107 99.2  F (37.3  C) Oral 107 20 97 %       PHYSICAL EXAM:   Constitutional:  Easily conversion, no acute distress, lying down semi reclined in waiting room.   HENT:  Normocephalic, posterior pharynx wnl, mucous membranes are dark pink and tacky.   Eyes:  PERRL, EOMI, Conjunctiva normal, No discharge, no scleral icterus.  Respiratory:  Breathing easily,  Cardiovascular:  Regular rate and rhythm. nl s1s2 0 murmurs, rubs, or gallops.  Peripheral pulses dp, pt, and radial are wnl.  No peripheral edema. Lungs are clear to auscultation   GI:  Bowel sounds normal, Soft, No tenderness, No flank tenderness, nondistended.  :No CVA tenderness.   Musculoskeletal:  Moves all extremities.  No erythematous or swollen major joints,   Integument:  Normal skin color, couple scabs in lower extremities.   Lymphatic:  No cervical lymphadenopathy  Neurologic:  Alert & oriented x 3, Normal motor function, Normal sensory function, No focal deficits noted. Normal speech.  Psychiatric:  Affect normal, Judgment normal,  Mood normal.     Results     LAB AND RADIOLOGY:  All pertinent labs reviewed and interpreted  Results for orders placed or performed during the hospital encounter of 11/07/22   CT Chest Pulmonary Embolism w Contrast     Status: None    Narrative    EXAM: CT CHEST PULMONARY EMBOLISM W CONTRAST, CT ABDOMEN PELVIS W CONTRAST  LOCATION: [facility]  DATE/TIME: 11/7/2022 12:42 PM    INDICATION: chest pain, recent hospitalization for bloody infectious diarrhea e.coli with shiga toxin and colitis.  COMPARISON: 05/11/2015  and 11/02/2022  TECHNIQUE: CT chest pulmonary angiogram and routine CT abdomen pelvis with IV contrast. Arterial phase through the chest and venous phase through the abdomen and pelvis. Multiplanar reformats and MIP reconstructions were performed. Dose reduction   techniques were used.   CONTRAST: Isovue 370 90ml    FINDINGS:  ANGIOGRAM CHEST: Pulmonary arteries are normal caliber and negative for pulmonary emboli. Thoracic aorta is negative for dissection.    LUNGS AND PLEURA: Small pleural effusions with atelectasis.     MEDIASTINUM/AXILLAE: The heart is at the upper limits of normal in size.     CORONARY ARTERY CALCIFICATION: None.    HEPATOBILIARY: Hepatic steatosis.     PANCREAS: Normal.    SPLEEN: Normal.    ADRENAL GLANDS: Normal.    KIDNEYS/BLADDER: Normal.    BOWEL: Wall thickening of the ascending colon with mild adjacent stranding has slightly improved.     LYMPH NODES: Normal.    VASCULATURE: Unremarkable.    PELVIC ORGANS: Normal.    MUSCULOSKELETAL: Normal.      Impression    IMPRESSION:  1.  No PE.  2.  Wall thickening of the ascending colon has improved.   3.  Hepatic steatosis.  4.  Small pleural effusions with atelectasis.   CT Abdomen Pelvis w Contrast     Status: None    Narrative    EXAM: CT CHEST PULMONARY EMBOLISM W CONTRAST, CT ABDOMEN PELVIS W CONTRAST  LOCATION: [facility]  DATE/TIME: 11/7/2022 12:42 PM    INDICATION: chest pain,  recent hospitalization for bloody infectious diarrhea e.coli with shiga toxin and colitis.  COMPARISON: 05/11/2015  and 11/02/2022  TECHNIQUE: CT chest pulmonary angiogram and routine CT abdomen pelvis with IV contrast. Arterial phase through the chest and venous phase through the abdomen and pelvis. Multiplanar reformats and MIP reconstructions were performed. Dose reduction   techniques were used.   CONTRAST: Isovue 370 90ml    FINDINGS:  ANGIOGRAM CHEST: Pulmonary arteries are normal caliber and negative for pulmonary emboli. Thoracic aorta is negative for dissection.    LUNGS AND PLEURA: Small pleural effusions with atelectasis.     MEDIASTINUM/AXILLAE: The heart is at the upper limits of normal in size.     CORONARY ARTERY CALCIFICATION: None.    HEPATOBILIARY: Hepatic steatosis.     PANCREAS: Normal.    SPLEEN: Normal.    ADRENAL GLANDS: Normal.    KIDNEYS/BLADDER: Normal.    BOWEL: Wall thickening of the ascending colon with mild adjacent stranding has slightly improved.     LYMPH NODES: Normal.    VASCULATURE: Unremarkable.    PELVIC ORGANS: Normal.    MUSCULOSKELETAL: Normal.      Impression    IMPRESSION:  1.  No PE.  2.  Wall thickening of the ascending colon has improved.   3.  Hepatic steatosis.  4.  Small pleural effusions with atelectasis.   Troponin I (now)     Status: Normal   Result Value Ref Range    Troponin I 0.09 0.00 - 0.29 ng/mL   Comprehensive metabolic panel     Status: Abnormal   Result Value Ref Range    Sodium 140 136 - 145 mmol/L    Potassium 3.0 (L) 3.5 - 5.0 mmol/L    Chloride 102 98 - 107 mmol/L    Carbon Dioxide (CO2) 27 22 - 31 mmol/L    Anion Gap 11 5 - 18 mmol/L    Urea Nitrogen 4 (L) 8 - 22 mg/dL    Creatinine 0.82 0.70 - 1.30 mg/dL    Calcium 9.2 8.5 - 10.5 mg/dL    Glucose 133 (H) 70 - 125 mg/dL    Alkaline Phosphatase 81 45 - 120 U/L    AST 18 0 - 40 U/L    ALT 26 0 - 45 U/L    Protein Total 7.5 6.0 - 8.0 g/dL    Albumin 3.2 (L) 3.5 - 5.0 g/dL    Bilirubin Total 0.6 0.0 - 1.0  mg/dL    GFR Estimate >90 >60 mL/min/1.73m2   INR     Status: Normal   Result Value Ref Range    INR 1.13 0.85 - 1.15   PTT     Status: Normal   Result Value Ref Range    aPTT 28 22 - 38 Seconds   Lactic acid whole blood     Status: Normal   Result Value Ref Range    Lactic Acid 1.4 0.7 - 2.0 mmol/L   CBC with platelets and differential     Status: None   Result Value Ref Range    WBC Count 10.7 4.0 - 11.0 10e3/uL    RBC Count 5.14 4.40 - 5.90 10e6/uL    Hemoglobin 14.4 13.3 - 17.7 g/dL    Hematocrit 43.7 40.0 - 53.0 %    MCV 85 78 - 100 fL    MCH 28.0 26.5 - 33.0 pg    MCHC 33.0 31.5 - 36.5 g/dL    RDW 13.2 10.0 - 15.0 %    Platelet Count 288 150 - 450 10e3/uL    % Neutrophils 56 %    % Lymphocytes 32 %    % Monocytes 9 %    % Eosinophils 1 %    % Basophils 1 %    % Immature Granulocytes 1 %    NRBCs per 100 WBC 0 <1 /100    Absolute Neutrophils 6.1 1.6 - 8.3 10e3/uL    Absolute Lymphocytes 3.4 0.8 - 5.3 10e3/uL    Absolute Monocytes 1.0 0.0 - 1.3 10e3/uL    Absolute Eosinophils 0.1 0.0 - 0.7 10e3/uL    Absolute Basophils 0.1 0.0 - 0.2 10e3/uL    Absolute Immature Granulocytes 0.1 <=0.4 10e3/uL    Absolute NRBCs 0.0 10e3/uL   Lipase     Status: Abnormal   Result Value Ref Range    Lipase 117 (H) 0 - 52 U/L   RBC and Platelet Morphology     Status: Abnormal   Result Value Ref Range    Platelet Assessment  Automated Count Confirmed. Platelet morphology is normal.     Automated Count Confirmed. Platelet morphology is normal.    Reactive Lymphocytes Present (A) None Seen    RBC Morphology Confirmed RBC Indices    Troponin I (now)     Status: Normal   Result Value Ref Range    Troponin I 0.09 0.00 - 0.29 ng/mL   ECG 12-LEAD WITH MUSE (L[facility])     Status: None   Result Value Ref Range    Systolic Blood Pressure  mmHg    Diastolic Blood Pressure  mmHg    Ventricular Rate 95 BPM    Atrial Rate 95 BPM    MA Interval 146 ms    QRS Duration 94 ms     ms    QTc 452 ms    P Axis 48 degrees    R AXIS -22 degrees    T Axis 23  degrees    Interpretation ECG       Sinus rhythm  Voltage criteria for left ventricular hypertrophy  Abnormal ECG  When compared with ECG of 07-NOV-2022 10:35,  ST less elevated in Lateral leads  T wave inversion no longer evident in Inferior leads  Confirmed by SEE ED PROVIDER NOTE FOR, ECG INTERPRETATION (4000),  Mariah Gonzalez ([id number]) on 11/7/2022 11:40:08 AM     CBC with platelets + differential     Status: Abnormal    Narrative    The following orders were created for panel order CBC with platelets + differential.  Procedure                               Abnormality         Status                     ---------                               -----------         ------                     CBC with platelets and d...[[id number]]                      Final result               RBC and Platelet Morphology[[id number]]  Abnormal            Final result                 Please view results for these tests on the individual orders.         ECG:    Performed at: 1042    Impression: nsr rate of 95, lae, voltage criteria for lvh, no acute st changes.  Possible pr depression in v4-5.  Pr 146ms, qrs 94ms, qtc 452ms, prt axes 48 -22 23.      Comparison: initial ekg at 1035 uninterpretable due to wandering baseline in limb leads and muscle artifact.    I have independently reviewed and interpreted the EKS(s) documented above    PROCEDURES:  Procedures:           The creation of this record is based on the scribe s observations of the work being performed by Arie Coelho and the provider s statements to them. This document has been checked and approved by MD Ana Bravo MD  Emergency Medicine  [facility] EMERGENCY ROOM       Ana Arrieta MD  11/07/22 0966

## 2022-11-11 LAB
C COLI+JEJUNI+LARI FUSA STL QL NAA+PROBE: NOT DETECTED
EC STX1 GENE STL QL NAA+PROBE: NOT DETECTED
EC STX2 GENE STL QL NAA+PROBE: DETECTED
NOROV GI+II ORF1-ORF2 JNC STL QL NAA+PR: NOT DETECTED
RVA NSP5 STL QL NAA+PROBE: NOT DETECTED
SALMONELLA SP RPOD STL QL NAA+PROBE: NOT DETECTED
SHIGELLA SP+EIEC IPAH STL QL NAA+PROBE: NOT DETECTED
V CHOL+PARA RFBL+TRKH+TNAA STL QL NAA+PR: NOT DETECTED
Y ENTERO RECN STL QL NAA+PROBE: NOT DETECTED

## 2022-11-12 LAB
BACTERIA BLD CULT: NO GROWTH
BACTERIA BLD CULT: NO GROWTH

## 2023-06-01 ENCOUNTER — HEALTH MAINTENANCE LETTER (OUTPATIENT)
Age: 43
End: 2023-06-01

## 2023-11-21 ENCOUNTER — OFFICE VISIT (OUTPATIENT)
Dept: FAMILY MEDICINE | Facility: CLINIC | Age: 43
End: 2023-11-21
Payer: COMMERCIAL

## 2023-11-21 VITALS
TEMPERATURE: 98.6 F | HEART RATE: 74 BPM | RESPIRATION RATE: 16 BRPM | SYSTOLIC BLOOD PRESSURE: 141 MMHG | OXYGEN SATURATION: 98 % | DIASTOLIC BLOOD PRESSURE: 92 MMHG

## 2023-11-21 DIAGNOSIS — J02.9 VIRAL PHARYNGITIS: Primary | ICD-10-CM

## 2023-11-21 DIAGNOSIS — H10.33 ACUTE BACTERIAL CONJUNCTIVITIS OF BOTH EYES: ICD-10-CM

## 2023-11-21 LAB
DEPRECATED S PYO AG THROAT QL EIA: NEGATIVE
GROUP A STREP BY PCR: NOT DETECTED

## 2023-11-21 PROCEDURE — 87651 STREP A DNA AMP PROBE: CPT | Performed by: PHYSICIAN ASSISTANT

## 2023-11-21 PROCEDURE — 99213 OFFICE O/P EST LOW 20 MIN: CPT | Performed by: PHYSICIAN ASSISTANT

## 2023-11-21 RX ORDER — OFLOXACIN 3 MG/ML
1-2 SOLUTION/ DROPS OPHTHALMIC 4 TIMES DAILY
Qty: 5 ML | Refills: 0 | Status: SHIPPED | OUTPATIENT
Start: 2023-11-21 | End: 2023-11-28

## 2023-11-21 RX ORDER — ESCITALOPRAM OXALATE 10 MG/1
10 TABLET ORAL DAILY
COMMUNITY

## 2023-11-21 RX ORDER — QUETIAPINE FUMARATE 50 MG/1
50-100 TABLET, FILM COATED ORAL
COMMUNITY
Start: 2023-06-02

## 2023-11-22 NOTE — PROGRESS NOTES
Assessment & Plan:      Problem List Items Addressed This Visit    None  Visit Diagnoses       Viral pharyngitis    -  Primary    Relevant Orders    Streptococcus A Rapid Screen w/Reflex to PCR - Clinic Collect (Completed)    Group A Streptococcus PCR Throat Swab    Acute bacterial conjunctivitis of both eyes        Relevant Medications    ofloxacin (OCUFLOX) 0.3 % ophthalmic solution          Medical Decision Making  Patient presents with sore throat, eye redness, and hoarse voice for 1 week.  Symptoms appear consistent with a viral respiratory infection.  Rapid strep is negative.  Eye redness appears viral at this time as well.  Printed prescription for antibiotic eyedrops if eye crusting and discharge worsens.  Discussed treatment and symptomatic care.  Allergies and medication interactions reviewed.  Discussed signs of worsening symptoms and when to follow-up with PCP if no symptom improvement.     Subjective:      Matteo Arellano is a 43 year old male here for evaluation of sore throat, eye redness, and hoarse voice.  Onset of symptoms was 1 week ago with gradual worsening since then.  Patient recently had tonsils removed due to frequent strep throat infections.  No fevers.  Patient does have history of contact lens use.     The following portions of the patient's history were reviewed and updated as appropriate: allergies, current medications, and problem list.     Review of Systems  Pertinent items are noted in HPI.    Allergies  Allergies   Allergen Reactions    Ambien [Zolpidem] Hives       No family history on file.    Social History     Tobacco Use    Smoking status: Never    Smokeless tobacco: Never   Substance Use Topics    Alcohol use: Yes     Comment: Alcoholic Drinks/day: occassional        Objective:      BP (!) 141/92   Pulse 74   Temp 98.6  F (37  C) (Oral)   Resp 16   SpO2 98%   General appearance - alert, well appearing, and in no distress and non-toxic  Eyes - conjunctivae/sclera appear  injected, no purulent discharge seen  Ears - bilateral TM's and external ear canals normal  Nose - normal and patent, no erythema, discharge or polyps  Mouth - tonsils previously removed, posterior pharynx does appear mildly erythematous, no exudates seen  Neck - supple, no significant adenopathy     Lab & Imaging Results    Results for orders placed or performed in visit on 11/21/23   Streptococcus A Rapid Screen w/Reflex to PCR - Clinic Collect     Status: Normal    Specimen: Throat; Swab   Result Value Ref Range    Group A Strep antigen Negative Negative       I personally reviewed these results and discussed findings with the patient.    The use of Dragon/Track dictation services was used to construct the content of this note; any grammatical errors are non-intentional. Please contact the author directly if you are in need of any clarification.

## 2024-06-16 ENCOUNTER — HEALTH MAINTENANCE LETTER (OUTPATIENT)
Age: 44
End: 2024-06-16

## 2025-06-21 ENCOUNTER — HEALTH MAINTENANCE LETTER (OUTPATIENT)
Age: 45
End: 2025-06-21